# Patient Record
Sex: FEMALE | Race: WHITE | NOT HISPANIC OR LATINO | Employment: FULL TIME
[De-identification: names, ages, dates, MRNs, and addresses within clinical notes are randomized per-mention and may not be internally consistent; named-entity substitution may affect disease eponyms.]

---

## 2017-08-05 ENCOUNTER — HEALTH MAINTENANCE LETTER (OUTPATIENT)
Age: 22
End: 2017-08-05

## 2019-06-19 ENCOUNTER — HOSPITAL ENCOUNTER (EMERGENCY)
Facility: CLINIC | Age: 24
Discharge: HOME | End: 2019-06-19
Attending: STUDENT IN AN ORGANIZED HEALTH CARE EDUCATION/TRAINING PROGRAM | Admitting: STUDENT IN AN ORGANIZED HEALTH CARE EDUCATION/TRAINING PROGRAM
Payer: COMMERCIAL

## 2019-06-19 VITALS
RESPIRATION RATE: 16 BRPM | OXYGEN SATURATION: 99 % | TEMPERATURE: 98.8 F | HEART RATE: 76 BPM | WEIGHT: 150 LBS | DIASTOLIC BLOOD PRESSURE: 99 MMHG | SYSTOLIC BLOOD PRESSURE: 134 MMHG | BODY MASS INDEX: 26.57 KG/M2

## 2019-06-19 DIAGNOSIS — F41.9 ANXIETY: ICD-10-CM

## 2019-06-19 LAB
ANION GAP SERPL CALCULATED.3IONS-SCNC: 5 MMOL/L (ref 3–14)
BASOPHILS # BLD AUTO: 0 10E9/L (ref 0–0.2)
BASOPHILS NFR BLD AUTO: 0.6 %
BUN SERPL-MCNC: 6 MG/DL (ref 7–30)
CALCIUM SERPL-MCNC: 9.5 MG/DL (ref 8.5–10.1)
CHLORIDE SERPL-SCNC: 107 MMOL/L (ref 94–109)
CO2 SERPL-SCNC: 28 MMOL/L (ref 20–32)
CREAT SERPL-MCNC: 0.44 MG/DL (ref 0.52–1.04)
DIFFERENTIAL METHOD BLD: NORMAL
EOSINOPHIL # BLD AUTO: 0.2 10E9/L (ref 0–0.7)
EOSINOPHIL NFR BLD AUTO: 2.3 %
ERYTHROCYTE [DISTWIDTH] IN BLOOD BY AUTOMATED COUNT: 12.1 % (ref 10–15)
GFR SERPL CREATININE-BSD FRML MDRD: >90 ML/MIN/{1.73_M2}
GLUCOSE SERPL-MCNC: 93 MG/DL (ref 70–99)
HCG SERPL QL: NEGATIVE
HCT VFR BLD AUTO: 43.5 % (ref 35–47)
HGB BLD-MCNC: 14.5 G/DL (ref 11.7–15.7)
IMM GRANULOCYTES # BLD: 0 10E9/L (ref 0–0.4)
IMM GRANULOCYTES NFR BLD: 0.2 %
LYMPHOCYTES # BLD AUTO: 2.1 10E9/L (ref 0.8–5.3)
LYMPHOCYTES NFR BLD AUTO: 32.8 %
MCH RBC QN AUTO: 30.7 PG (ref 26.5–33)
MCHC RBC AUTO-ENTMCNC: 33.3 G/DL (ref 31.5–36.5)
MCV RBC AUTO: 92 FL (ref 78–100)
MONOCYTES # BLD AUTO: 0.6 10E9/L (ref 0–1.3)
MONOCYTES NFR BLD AUTO: 9.2 %
NEUTROPHILS # BLD AUTO: 3.6 10E9/L (ref 1.6–8.3)
NEUTROPHILS NFR BLD AUTO: 54.9 %
NRBC # BLD AUTO: 0 10*3/UL
NRBC BLD AUTO-RTO: 0 /100
PLATELET # BLD AUTO: 250 10E9/L (ref 150–450)
POTASSIUM SERPL-SCNC: 3.7 MMOL/L (ref 3.4–5.3)
RBC # BLD AUTO: 4.72 10E12/L (ref 3.8–5.2)
SODIUM SERPL-SCNC: 140 MMOL/L (ref 133–144)
TROPONIN I SERPL-MCNC: <0.015 UG/L (ref 0–0.04)
WBC # BLD AUTO: 6.5 10E9/L (ref 4–11)

## 2019-06-19 PROCEDURE — 25000132 ZZH RX MED GY IP 250 OP 250 PS 637: Performed by: STUDENT IN AN ORGANIZED HEALTH CARE EDUCATION/TRAINING PROGRAM

## 2019-06-19 PROCEDURE — 99284 EMERGENCY DEPT VISIT MOD MDM: CPT | Performed by: STUDENT IN AN ORGANIZED HEALTH CARE EDUCATION/TRAINING PROGRAM

## 2019-06-19 PROCEDURE — 93005 ELECTROCARDIOGRAM TRACING: CPT | Performed by: STUDENT IN AN ORGANIZED HEALTH CARE EDUCATION/TRAINING PROGRAM

## 2019-06-19 PROCEDURE — 84703 CHORIONIC GONADOTROPIN ASSAY: CPT | Performed by: STUDENT IN AN ORGANIZED HEALTH CARE EDUCATION/TRAINING PROGRAM

## 2019-06-19 PROCEDURE — 90791 PSYCH DIAGNOSTIC EVALUATION: CPT

## 2019-06-19 PROCEDURE — 99285 EMERGENCY DEPT VISIT HI MDM: CPT | Mod: 25 | Performed by: STUDENT IN AN ORGANIZED HEALTH CARE EDUCATION/TRAINING PROGRAM

## 2019-06-19 PROCEDURE — 93010 ELECTROCARDIOGRAM REPORT: CPT | Mod: Z6 | Performed by: STUDENT IN AN ORGANIZED HEALTH CARE EDUCATION/TRAINING PROGRAM

## 2019-06-19 PROCEDURE — 80048 BASIC METABOLIC PNL TOTAL CA: CPT | Performed by: STUDENT IN AN ORGANIZED HEALTH CARE EDUCATION/TRAINING PROGRAM

## 2019-06-19 PROCEDURE — 85025 COMPLETE CBC W/AUTO DIFF WBC: CPT | Performed by: STUDENT IN AN ORGANIZED HEALTH CARE EDUCATION/TRAINING PROGRAM

## 2019-06-19 PROCEDURE — 84484 ASSAY OF TROPONIN QUANT: CPT | Performed by: STUDENT IN AN ORGANIZED HEALTH CARE EDUCATION/TRAINING PROGRAM

## 2019-06-19 RX ORDER — HYDROXYZINE HYDROCHLORIDE 25 MG/1
25-50 TABLET, FILM COATED ORAL EVERY 8 HOURS PRN
Qty: 30 TABLET | Refills: 0 | Status: SHIPPED | OUTPATIENT
Start: 2019-06-19 | End: 2021-02-10

## 2019-06-19 RX ORDER — HYDROXYZINE HYDROCHLORIDE 25 MG/1
25 TABLET, FILM COATED ORAL ONCE
Status: COMPLETED | OUTPATIENT
Start: 2019-06-19 | End: 2019-06-19

## 2019-06-19 RX ADMIN — HYDROXYZINE HYDROCHLORIDE 25 MG: 25 TABLET ORAL at 06:49

## 2019-06-19 NOTE — ED PROVIDER NOTES
"  History     Chief Complaint   Patient presents with     Panic Attack     HPI  Anali Lomas is a 23 year old female with past medical history which includes anxiety and migraine headaches who presents for evaluation of feelings of anxiousness and \"panic attacks\".  Patient explains that last evening around 8 PM she began feeling discomfort around her left shoulder, admittedly grew fixated on the symptom and potential cause of a heart attack.  She remained anxious overnight and had a very difficult time sleeping secondary to the symptoms.  Although it is described as a dull achy left-sided shoulder pain, she cannot stop worrying that it could be a symptom of a heart attack.  She denies fever, chills, substernal chest pressure, radiating pain, shortness of breath, back pain, hemoptysis, abdominal pain, or gastrointestinal symptoms.  Patient admits that she discontinued prescription medication Effexor 2-3 weeks ago because she felt as though it was not working.  Been working with her primary provider, she states that she has tried 3 separate medications including Prozac, Lexapro, and Effexor for her symptoms of anxiety, panic attacks, and OCD but not offered significant relief.  She also has a short prescription of lorazepam but admits that she rarely uses.  She has considered enrolling in counseling and/or with a psychologist but has yet to schedule an appointment.  She denies recent injury or illness.  Patient occasionally uses alcohol and correlates that with increasing her anxiety.  She denies tobacco, illicit drug, stimulant or caffeine use.    Allergies:  Allergies   Allergen Reactions     Zithromax [Azithromycin Dihydrate] Rash       Problem List:    Patient Active Problem List    Diagnosis Date Noted     Mixed hyperlipidemia 09/15/2016     Priority: Medium     Migraine with aura, not intractable, without status migrainosus 09/13/2016     Priority: Medium        Past Medical History:    No past medical " history on file.    Past Surgical History:    No past surgical history on file.    Family History:    No family history on file.    Social History:  Marital Status:   [2]  Social History     Tobacco Use     Smoking status: Never Smoker   Substance Use Topics     Alcohol use: Yes     Comment: 1 drink per week     Drug use: No        Medications:      hydrOXYzine (ATARAX) 25 MG tablet   AMOXICILLIN PO   miconazole nitrate (MONISTAT 3) 4 % CREA   senna (SENOKOT) 8.6 MG tablet         Review of Systems  Constitutional:  Negative for fever or recent illness.  Cardiovascular:  Negative for chest pain.  Respiratory:  Negative for cough or shortness of breath.  Gastrointestinal:  Negative for abdominal pain, nausea or vomiting.  Musculoskeletal: Positive for dull achy left shoulder discomfort.  Negative for recent fall or injury.  Neurological:  Negative for headache.    All others reviewed and are negative.      Physical Exam   BP: (!) 139/108  Pulse: 86  Heart Rate: 94  Temp: 98.8  F (37.1  C)  Resp: 18  Weight: 68 kg (150 lb)  SpO2: 99 %      Physical Exam  Constitutional:  Well developed, well nourished.  Appears anxious but in no acute distress.  HENT:  Normocephalic and atraumatic.  Symmetric in appearance.  Eyes:  Conjunctivae are normal.  Neck:  Neck supple.  Cardiovascular:  No cyanosis.  RRR.  No audible murmurs noted.  No lower extremity edema or asymmetry.   Respiratory:  Effort normal without sign of respiratory distress.  CTAB without diminished regions.    Gastrointestinal:  Soft nondistended abdomen.  Nontender and without guarding.  No rigidity or rebound tenderness.  Negative Aleman's sign.  Negative McBurney's point.    Musculoskeletal:  Moves extremities spontaneously.  Neurological:  Patient is alert.  Skin:  Skin is warm and dry.  Psych:  Well-kept appearance.  Patient does not show signs of confusion or intoxication.  Able to carry a conversation with organized speech and thought process.   Seems to have insight into their perceived condition.  Daughter really anxious but is not agitated or threatening.  Denies suicidal ideation or intent to harm self/others.  Denies visual or auditory hallucinations.  Without evidence of delusions or psychosis.      ED Course        Procedures                 EKG Interpretation:      Interpreted by: Leighton Calero  Time reviewed: Upon arrival     Symptoms at time of EKG: Anxiety   Rhythm: Sinus  Rate: Normal  Axis: Normal    Conduction: None atypical   ST Segments/ T Waves: No pathologic ST-elevations or T-wave abnormalities.  Q Waves: None  Comparison to prior: None available    Clinical Impression: No sign of ischemia or arrhythmia        Critical Care time:  none               Results for orders placed or performed during the hospital encounter of 06/19/19 (from the past 24 hour(s))   Troponin I   Result Value Ref Range    Troponin I ES <0.015 0.000 - 0.045 ug/L   Basic metabolic panel   Result Value Ref Range    Sodium 140 133 - 144 mmol/L    Potassium 3.7 3.4 - 5.3 mmol/L    Chloride 107 94 - 109 mmol/L    Carbon Dioxide 28 20 - 32 mmol/L    Anion Gap 5 3 - 14 mmol/L    Glucose 93 70 - 99 mg/dL    Urea Nitrogen 6 (L) 7 - 30 mg/dL    Creatinine 0.44 (L) 0.52 - 1.04 mg/dL    GFR Estimate >90 >60 mL/min/[1.73_m2]    GFR Estimate If Black >90 >60 mL/min/[1.73_m2]    Calcium 9.5 8.5 - 10.1 mg/dL   CBC with platelets differential   Result Value Ref Range    WBC 6.5 4.0 - 11.0 10e9/L    RBC Count 4.72 3.8 - 5.2 10e12/L    Hemoglobin 14.5 11.7 - 15.7 g/dL    Hematocrit 43.5 35.0 - 47.0 %    MCV 92 78 - 100 fl    MCH 30.7 26.5 - 33.0 pg    MCHC 33.3 31.5 - 36.5 g/dL    RDW 12.1 10.0 - 15.0 %    Platelet Count 250 150 - 450 10e9/L    Diff Method Automated Method     % Neutrophils 54.9 %    % Lymphocytes 32.8 %    % Monocytes 9.2 %    % Eosinophils 2.3 %    % Basophils 0.6 %    % Immature Granulocytes 0.2 %    Nucleated RBCs 0 0 /100    Absolute Neutrophil 3.6 1.6 - 8.3  10e9/L    Absolute Lymphocytes 2.1 0.8 - 5.3 10e9/L    Absolute Monocytes 0.6 0.0 - 1.3 10e9/L    Absolute Eosinophils 0.2 0.0 - 0.7 10e9/L    Absolute Basophils 0.0 0.0 - 0.2 10e9/L    Abs Immature Granulocytes 0.0 0 - 0.4 10e9/L    Absolute Nucleated RBC 0.0    HCG qualitative pregnancy (blood)   Result Value Ref Range    HCG Qualitative Serum Negative NEG^Negative       Medications   hydrOXYzine (ATARAX) tablet 25 mg (25 mg Oral Given 6/19/19 0649)       Assessments & Plan (with Medical Decision Making)   Anali Lomas is a 23 year old female who presented to the department for evaluation of in which she believes to be a panic reaction.  She has a long history of similar symptoms but is not currently taking long-term medications for her anxiety.  Regarding her left shoulder discomfort, no tenderness and low suspicion for musculoskeletal etiology.  EKG morphology without ischemia troponin within reference range despite duration of symptoms.  She describes the anxiety triggered by her symptoms to be keeping her up most of the night and sounds somewhat debilitating.  She adamantly denies suicidal ideation or plan, does not seem to be delusional or suffering from psychoses.  She obtained a leave from symptoms after single tablet of hydroxyzine.  Independently interviewed by [facility] who recommends discharge and outpatient therapy.  Patient seems comfortable with this plan, will receive a call from outpatient team to schedule in the near future.  She was also given recommendations to follow-up with primary care provider regarding medication management and return instructions to the department for eloping symptoms or other concerns or          Disclaimer:  This note consists of symbols derived from keyboarding, dictation, and/or voice recognition software.  As a result, there may be errors in the script that have gone undetected.  Please consider this when interpreting information found in the chart.        I have  reviewed the nursing notes.    I have reviewed the findings, diagnosis, plan and need for follow up with the patient.          Medication List      Started    hydrOXYzine 25 MG tablet  Commonly known as:  ATARAX  25-50 mg, Oral, EVERY 8 HOURS PRN            Final diagnoses:   Anxiety       6/19/2019   [facility] EMERGENCY DEPARTMENT     Leighton Calero DO  06/19/19 1114

## 2019-06-19 NOTE — ED AVS SNAPSHOT
[facility] Emergency Department  [address] [address]  Phone:  [phone]  Fax:  [phone]                                    Anali Lomas   MRN: [id number]    Department:  [facility] Emergency Department   Date of Visit:  6/19/2019           After Visit Summary Signature Page    I have received my discharge instructions, and my questions have been answered. I have discussed any challenges I see with this plan with the nurse or doctor.    ..........................................................................................................................................  Patient/Patient Representative Signature      ..........................................................................................................................................  Patient Representative Print Name and Relationship to Patient    ..................................................               ................................................  Date                                   Time    ..........................................................................................................................................  Reviewed by Signature/Title    ...................................................              ..............................................  Date                                               Time          22EPIC Rev 08/18

## 2019-06-19 NOTE — ED TRIAGE NOTES
Patient states started having anxiety about 2000 last night became SOB and having her heart feel like it was racing with inability to sleep more than 2 hours total. Patient very tearful at time of triage. States she has not been on any medications for about 3 or 4 weeks related to anxiety because they just don't work for her. Patient  in room at bedside.

## 2019-09-13 ENCOUNTER — TRANSFERRED RECORDS (OUTPATIENT)
Dept: HEALTH INFORMATION MANAGEMENT | Facility: CLINIC | Age: 24
End: 2019-09-13

## 2019-11-05 ENCOUNTER — HEALTH MAINTENANCE LETTER (OUTPATIENT)
Age: 24
End: 2019-11-05

## 2020-09-22 LAB
ABO + RH BLD: NORMAL
ABO + RH BLD: NORMAL
BLD GP AB SCN SERPL QL: NEGATIVE
HEMOGLOBIN: 14 G/DL (ref 11.7–15.7)
RUBELLA ABY IGG: NORMAL

## 2020-10-01 ENCOUNTER — TRANSFERRED RECORDS (OUTPATIENT)
Dept: HEALTH INFORMATION MANAGEMENT | Facility: CLINIC | Age: 25
End: 2020-10-01

## 2020-10-01 LAB — PAP SMEAR - HIM PATIENT REPORTED: NEGATIVE

## 2020-11-22 ENCOUNTER — HEALTH MAINTENANCE LETTER (OUTPATIENT)
Age: 25
End: 2020-11-22

## 2021-02-10 ENCOUNTER — OFFICE VISIT (OUTPATIENT)
Dept: FAMILY MEDICINE | Facility: CLINIC | Age: 26
End: 2021-02-10
Payer: COMMERCIAL

## 2021-02-10 VITALS
DIASTOLIC BLOOD PRESSURE: 78 MMHG | HEART RATE: 94 BPM | BODY MASS INDEX: 33.84 KG/M2 | TEMPERATURE: 98.7 F | OXYGEN SATURATION: 96 % | WEIGHT: 191 LBS | SYSTOLIC BLOOD PRESSURE: 122 MMHG | HEIGHT: 63 IN

## 2021-02-10 DIAGNOSIS — F41.1 GENERALIZED ANXIETY DISORDER: ICD-10-CM

## 2021-02-10 DIAGNOSIS — F42.9 OBSESSIVE-COMPULSIVE DISORDER, UNSPECIFIED TYPE: ICD-10-CM

## 2021-02-10 DIAGNOSIS — G43.109 MIGRAINE WITH AURA, NOT INTRACTABLE, WITHOUT STATUS MIGRAINOSUS: ICD-10-CM

## 2021-02-10 PROBLEM — F41.9 ANXIETY: Status: ACTIVE | Noted: 2021-02-10

## 2021-02-10 PROCEDURE — 99202 OFFICE O/P NEW SF 15 MIN: CPT | Performed by: FAMILY MEDICINE

## 2021-02-10 ASSESSMENT — PAIN SCALES - GENERAL: PAINLEVEL: NO PAIN (0)

## 2021-02-10 ASSESSMENT — MIFFLIN-ST. JEOR: SCORE: 1575.5

## 2021-02-10 NOTE — PROGRESS NOTES
"    Assessment & Plan   Estela is a ruby 25-year-old female who is currently 31 weeks pregnant.  She is here to establish care.  We have reviewed her past medical history, medications, social and family histories today.  Her chronic health issues are stable.  Generalized anxiety disorder  Obsessive-compulsive disorder, unspecified type  Migraine with aura, not intractable, without status migrainosus        25 minutes spent on the date of the encounter doing chart review, review of outside records, patient visit and documentation        BMI:   Estimated body mass index is 33.83 kg/m  as calculated from the following:    Height as of this encounter: 1.6 m (5' 3\").    Weight as of this encounter: 86.6 kg (191 lb).       Return for routine cares..    Sharita Bahena MD  [facility]    Asaf Palmer is a 25 year old who presents for the following health issues  accompanied by her self:    HPI       New Patient/Transfer of Care from [facility]  Due date: April 13th, 2021- having baby boy    Moved to [place] from [place] this last year.   [facility]. [facility].     Follows with the [facility] psychiatry for HAILEY and OCD.  Clomipramine for anxiety/ocd but off since pg.   Anxious and ocd tendandcies are present but has been managing okay.  She notes working from home has helped.  However, the pandemic has worsened some of her OCD as she is very worried about germs    31 weeks pg.   Planning to restart clomipramine following delivery    Migraines- none since pregnant    Pap smears all nl.   Last with ob-gyn at beginning of pg.     tdap completed recently at ob-gyn        Objective    /78 (BP Location: Right arm, Patient Position: Sitting, Cuff Size: Adult Large)   Pulse 94   Temp 98.7  F (37.1  C) (Tympanic)   Ht 1.6 m (5' 3\")   Wt 86.6 kg (191 lb)   SpO2 96%   BMI 33.83 kg/m    Body mass index is 33.83 kg/m .  Physical Exam   GENERAL: healthy, alert and no " distress

## 2021-02-10 NOTE — Clinical Note
Please abstract the following data from this visit with this patient into the appropriate field in Epic:    Tests that can be patient reported without a hard copy:    Pap smear done on this date: fall 2020 (approximately), by this group: ob-gyn [facility], results were nl.     Other Tests found in the patient's chart through Chart Review/Care Everywhere:    {Abstract Quality List (Optional):315978}    Note to Abstraction: If this section is blank, no results were found via Chart Review/Care Everywhere.

## 2021-03-16 LAB — GROUP B STREP PCR: POSITIVE

## 2021-04-13 DIAGNOSIS — Z34.90 ENCOUNTER FOR INDUCTION OF LABOR: Primary | ICD-10-CM

## 2021-04-13 RX ORDER — LIDOCAINE 40 MG/G
CREAM TOPICAL
Status: CANCELLED | OUTPATIENT
Start: 2021-04-13

## 2021-04-13 RX ORDER — OXYTOCIN/0.9 % SODIUM CHLORIDE 30/500 ML
1-24 PLASTIC BAG, INJECTION (ML) INTRAVENOUS CONTINUOUS
Status: CANCELLED | OUTPATIENT
Start: 2021-04-13

## 2021-04-14 ENCOUNTER — TRANSFERRED RECORDS (OUTPATIENT)
Dept: HEALTH INFORMATION MANAGEMENT | Facility: CLINIC | Age: 26
End: 2021-04-14

## 2021-04-14 ENCOUNTER — ANESTHESIA EVENT (OUTPATIENT)
Dept: OBGYN | Facility: CLINIC | Age: 26
End: 2021-04-14
Payer: COMMERCIAL

## 2021-04-14 ENCOUNTER — HOSPITAL ENCOUNTER (INPATIENT)
Facility: CLINIC | Age: 26
LOS: 2 days | Discharge: HOME | End: 2021-04-16
Attending: STUDENT IN AN ORGANIZED HEALTH CARE EDUCATION/TRAINING PROGRAM | Admitting: STUDENT IN AN ORGANIZED HEALTH CARE EDUCATION/TRAINING PROGRAM
Payer: COMMERCIAL

## 2021-04-14 ENCOUNTER — ANESTHESIA (OUTPATIENT)
Dept: OBGYN | Facility: CLINIC | Age: 26
End: 2021-04-14
Payer: COMMERCIAL

## 2021-04-14 DIAGNOSIS — Z34.90 ENCOUNTER FOR INDUCTION OF LABOR: Primary | ICD-10-CM

## 2021-04-14 LAB
ABO + RH BLD: NORMAL
ABO + RH BLD: NORMAL
AMPHETAMINES UR QL SCN: NEGATIVE
CANNABINOIDS UR QL: NEGATIVE
COCAINE UR QL: NEGATIVE
LABORATORY COMMENT REPORT: NORMAL
OPIATES UR QL SCN: NEGATIVE
PCP UR QL SCN: NEGATIVE
PLATELET # BLD AUTO: 205 10E9/L (ref 150–450)
RUPTURE OF FETAL MEMBRANES BY ROM PLUS: POSITIVE
SARS-COV-2 RNA RESP QL NAA+PROBE: NEGATIVE
SPECIMEN EXP DATE BLD: NORMAL
SPECIMEN SOURCE: NORMAL
T PALLIDUM AB SER QL: NONREACTIVE

## 2021-04-14 PROCEDURE — 36415 COLL VENOUS BLD VENIPUNCTURE: CPT | Performed by: PHYSICIAN ASSISTANT

## 2021-04-14 PROCEDURE — 86900 BLOOD TYPING SEROLOGIC ABO: CPT | Performed by: PHYSICIAN ASSISTANT

## 2021-04-14 PROCEDURE — 86780 TREPONEMA PALLIDUM: CPT | Performed by: PHYSICIAN ASSISTANT

## 2021-04-14 PROCEDURE — 87635 SARS-COV-2 COVID-19 AMP PRB: CPT | Performed by: STUDENT IN AN ORGANIZED HEALTH CARE EDUCATION/TRAINING PROGRAM

## 2021-04-14 PROCEDURE — 258N000003 HC RX IP 258 OP 636: Performed by: ANESTHESIOLOGY

## 2021-04-14 PROCEDURE — 250N000013 HC RX MED GY IP 250 OP 250 PS 637: Performed by: PHYSICIAN ASSISTANT

## 2021-04-14 PROCEDURE — 250N000011 HC RX IP 250 OP 636: Performed by: PHYSICIAN ASSISTANT

## 2021-04-14 PROCEDURE — 258N000003 HC RX IP 258 OP 636: Performed by: PHYSICIAN ASSISTANT

## 2021-04-14 PROCEDURE — 120N000012 HC R&B POSTPARTUM

## 2021-04-14 PROCEDURE — 85049 AUTOMATED PLATELET COUNT: CPT | Performed by: PHYSICIAN ASSISTANT

## 2021-04-14 PROCEDURE — 250N000013 HC RX MED GY IP 250 OP 250 PS 637: Performed by: STUDENT IN AN ORGANIZED HEALTH CARE EDUCATION/TRAINING PROGRAM

## 2021-04-14 PROCEDURE — G0463 HOSPITAL OUTPT CLINIC VISIT: HCPCS | Mod: 25

## 2021-04-14 PROCEDURE — 0KQM0ZZ REPAIR PERINEUM MUSCLE, OPEN APPROACH: ICD-10-PCS | Performed by: STUDENT IN AN ORGANIZED HEALTH CARE EDUCATION/TRAINING PROGRAM

## 2021-04-14 PROCEDURE — 250N000009 HC RX 250: Performed by: PHYSICIAN ASSISTANT

## 2021-04-14 PROCEDURE — 370N000003 HC ANESTHESIA WARD SERVICE

## 2021-04-14 PROCEDURE — C9803 HOPD COVID-19 SPEC COLLECT: HCPCS

## 2021-04-14 PROCEDURE — 86901 BLOOD TYPING SEROLOGIC RH(D): CPT | Performed by: PHYSICIAN ASSISTANT

## 2021-04-14 PROCEDURE — 250N000011 HC RX IP 250 OP 636: Performed by: ANESTHESIOLOGY

## 2021-04-14 PROCEDURE — 250N000009 HC RX 250: Performed by: ANESTHESIOLOGY

## 2021-04-14 PROCEDURE — 722N000001 HC LABOR CARE VAGINAL DELIVERY SINGLE

## 2021-04-14 PROCEDURE — 59025 FETAL NON-STRESS TEST: CPT

## 2021-04-14 PROCEDURE — 84112 EVAL AMNIOTIC FLUID PROTEIN: CPT | Performed by: OBSTETRICS & GYNECOLOGY

## 2021-04-14 PROCEDURE — 80307 DRUG TEST PRSMV CHEM ANLYZR: CPT | Performed by: STUDENT IN AN ORGANIZED HEALTH CARE EDUCATION/TRAINING PROGRAM

## 2021-04-14 PROCEDURE — 250N000009 HC RX 250: Performed by: STUDENT IN AN ORGANIZED HEALTH CARE EDUCATION/TRAINING PROGRAM

## 2021-04-14 RX ORDER — METHYLERGONOVINE MALEATE 0.2 MG/ML
200 INJECTION INTRAVENOUS
Status: DISCONTINUED | OUTPATIENT
Start: 2021-04-14 | End: 2021-04-16 | Stop reason: HOSPADM

## 2021-04-14 RX ORDER — OXYTOCIN/0.9 % SODIUM CHLORIDE 30/500 ML
340 PLASTIC BAG, INJECTION (ML) INTRAVENOUS CONTINUOUS PRN
Status: DISCONTINUED | OUTPATIENT
Start: 2021-04-14 | End: 2021-04-16 | Stop reason: HOSPADM

## 2021-04-14 RX ORDER — SODIUM CHLORIDE, SODIUM LACTATE, POTASSIUM CHLORIDE, CALCIUM CHLORIDE 600; 310; 30; 20 MG/100ML; MG/100ML; MG/100ML; MG/100ML
INJECTION, SOLUTION INTRAVENOUS CONTINUOUS
Status: DISCONTINUED | OUTPATIENT
Start: 2021-04-14 | End: 2021-04-16 | Stop reason: HOSPADM

## 2021-04-14 RX ORDER — OXYTOCIN 10 [USP'U]/ML
10 INJECTION, SOLUTION INTRAMUSCULAR; INTRAVENOUS
Status: DISCONTINUED | OUTPATIENT
Start: 2021-04-14 | End: 2021-04-15

## 2021-04-14 RX ORDER — ROPIVACAINE HYDROCHLORIDE 2 MG/ML
10 INJECTION, SOLUTION EPIDURAL; INFILTRATION; PERINEURAL ONCE
Status: COMPLETED | OUTPATIENT
Start: 2021-04-14 | End: 2021-04-14

## 2021-04-14 RX ORDER — LIDOCAINE HYDROCHLORIDE AND EPINEPHRINE 15; 5 MG/ML; UG/ML
INJECTION, SOLUTION EPIDURAL PRN
Status: DISCONTINUED | OUTPATIENT
Start: 2021-04-14 | End: 2021-04-15 | Stop reason: HOSPADM

## 2021-04-14 RX ORDER — OXYTOCIN/0.9 % SODIUM CHLORIDE 30/500 ML
100-340 PLASTIC BAG, INJECTION (ML) INTRAVENOUS CONTINUOUS PRN
Status: COMPLETED | OUTPATIENT
Start: 2021-04-14 | End: 2021-04-14

## 2021-04-14 RX ORDER — OXYTOCIN 10 [USP'U]/ML
10 INJECTION, SOLUTION INTRAMUSCULAR; INTRAVENOUS
Status: DISCONTINUED | OUTPATIENT
Start: 2021-04-14 | End: 2021-04-16 | Stop reason: HOSPADM

## 2021-04-14 RX ORDER — NALOXONE HYDROCHLORIDE 0.4 MG/ML
0.2 INJECTION, SOLUTION INTRAMUSCULAR; INTRAVENOUS; SUBCUTANEOUS
Status: DISCONTINUED | OUTPATIENT
Start: 2021-04-14 | End: 2021-04-16 | Stop reason: HOSPADM

## 2021-04-14 RX ORDER — PENICILLIN G POTASSIUM 5000000 [IU]/1
5 INJECTION, POWDER, FOR SOLUTION INTRAMUSCULAR; INTRAVENOUS ONCE
Status: COMPLETED | OUTPATIENT
Start: 2021-04-14 | End: 2021-04-14

## 2021-04-14 RX ORDER — TRANEXAMIC ACID 10 MG/ML
1 INJECTION, SOLUTION INTRAVENOUS EVERY 30 MIN PRN
Status: DISCONTINUED | OUTPATIENT
Start: 2021-04-14 | End: 2021-04-16 | Stop reason: HOSPADM

## 2021-04-14 RX ORDER — MODIFIED LANOLIN
OINTMENT (GRAM) TOPICAL
Status: DISCONTINUED | OUTPATIENT
Start: 2021-04-14 | End: 2021-04-16 | Stop reason: HOSPADM

## 2021-04-14 RX ORDER — ONDANSETRON 2 MG/ML
4 INJECTION INTRAMUSCULAR; INTRAVENOUS EVERY 6 HOURS PRN
Status: DISCONTINUED | OUTPATIENT
Start: 2021-04-14 | End: 2021-04-16 | Stop reason: HOSPADM

## 2021-04-14 RX ORDER — HYDROCORTISONE 2.5 %
CREAM (GRAM) TOPICAL 3 TIMES DAILY PRN
Status: DISCONTINUED | OUTPATIENT
Start: 2021-04-14 | End: 2021-04-16 | Stop reason: HOSPADM

## 2021-04-14 RX ORDER — NALOXONE HYDROCHLORIDE 0.4 MG/ML
0.4 INJECTION, SOLUTION INTRAMUSCULAR; INTRAVENOUS; SUBCUTANEOUS
Status: DISCONTINUED | OUTPATIENT
Start: 2021-04-14 | End: 2021-04-16 | Stop reason: HOSPADM

## 2021-04-14 RX ORDER — BISACODYL 10 MG
10 SUPPOSITORY, RECTAL RECTAL DAILY PRN
Status: DISCONTINUED | OUTPATIENT
Start: 2021-04-16 | End: 2021-04-16 | Stop reason: HOSPADM

## 2021-04-14 RX ORDER — IBUPROFEN 400 MG/1
800 TABLET, FILM COATED ORAL
Status: COMPLETED | OUTPATIENT
Start: 2021-04-14 | End: 2021-04-15

## 2021-04-14 RX ORDER — OXYCODONE AND ACETAMINOPHEN 5; 325 MG/1; MG/1
1 TABLET ORAL
Status: COMPLETED | OUTPATIENT
Start: 2021-04-14 | End: 2021-04-15

## 2021-04-14 RX ORDER — IBUPROFEN 400 MG/1
800 TABLET, FILM COATED ORAL EVERY 6 HOURS PRN
Status: DISCONTINUED | OUTPATIENT
Start: 2021-04-14 | End: 2021-04-16 | Stop reason: HOSPADM

## 2021-04-14 RX ORDER — OXYTOCIN/0.9 % SODIUM CHLORIDE 30/500 ML
1-24 PLASTIC BAG, INJECTION (ML) INTRAVENOUS CONTINUOUS
Status: DISCONTINUED | OUTPATIENT
Start: 2021-04-14 | End: 2021-04-15

## 2021-04-14 RX ORDER — FENTANYL CITRATE 50 UG/ML
100 INJECTION, SOLUTION INTRAMUSCULAR; INTRAVENOUS ONCE
Status: COMPLETED | OUTPATIENT
Start: 2021-04-14 | End: 2021-04-14

## 2021-04-14 RX ORDER — CARBOPROST TROMETHAMINE 250 UG/ML
250 INJECTION, SOLUTION INTRAMUSCULAR
Status: DISCONTINUED | OUTPATIENT
Start: 2021-04-14 | End: 2021-04-16 | Stop reason: HOSPADM

## 2021-04-14 RX ORDER — ONDANSETRON 4 MG/1
4 TABLET, ORALLY DISINTEGRATING ORAL EVERY 6 HOURS PRN
Status: DISCONTINUED | OUTPATIENT
Start: 2021-04-14 | End: 2021-04-16 | Stop reason: HOSPADM

## 2021-04-14 RX ORDER — NALBUPHINE HYDROCHLORIDE 10 MG/ML
2.5-5 INJECTION, SOLUTION INTRAMUSCULAR; INTRAVENOUS; SUBCUTANEOUS EVERY 6 HOURS PRN
Status: DISCONTINUED | OUTPATIENT
Start: 2021-04-14 | End: 2021-04-16 | Stop reason: HOSPADM

## 2021-04-14 RX ORDER — AMOXICILLIN 250 MG
1 CAPSULE ORAL 2 TIMES DAILY
Status: DISCONTINUED | OUTPATIENT
Start: 2021-04-14 | End: 2021-04-16 | Stop reason: HOSPADM

## 2021-04-14 RX ORDER — OXYTOCIN/0.9 % SODIUM CHLORIDE 30/500 ML
100 PLASTIC BAG, INJECTION (ML) INTRAVENOUS CONTINUOUS
Status: DISCONTINUED | OUTPATIENT
Start: 2021-04-14 | End: 2021-04-16 | Stop reason: HOSPADM

## 2021-04-14 RX ORDER — TRANEXAMIC ACID 10 MG/ML
1 INJECTION, SOLUTION INTRAVENOUS EVERY 30 MIN PRN
Status: DISCONTINUED | OUTPATIENT
Start: 2021-04-14 | End: 2021-04-15

## 2021-04-14 RX ORDER — EPHEDRINE SULFATE 50 MG/ML
5 INJECTION, SOLUTION INTRAMUSCULAR; INTRAVENOUS; SUBCUTANEOUS
Status: DISCONTINUED | OUTPATIENT
Start: 2021-04-14 | End: 2021-04-15

## 2021-04-14 RX ORDER — ACETAMINOPHEN 325 MG/1
650 TABLET ORAL EVERY 4 HOURS PRN
Status: DISCONTINUED | OUTPATIENT
Start: 2021-04-14 | End: 2021-04-16 | Stop reason: HOSPADM

## 2021-04-14 RX ORDER — AMOXICILLIN 250 MG
2 CAPSULE ORAL 2 TIMES DAILY
Status: DISCONTINUED | OUTPATIENT
Start: 2021-04-14 | End: 2021-04-16 | Stop reason: HOSPADM

## 2021-04-14 RX ORDER — ACETAMINOPHEN 325 MG/1
650 TABLET ORAL EVERY 4 HOURS PRN
Status: DISCONTINUED | OUTPATIENT
Start: 2021-04-14 | End: 2021-04-15

## 2021-04-14 RX ADMIN — SODIUM CHLORIDE, POTASSIUM CHLORIDE, SODIUM LACTATE AND CALCIUM CHLORIDE 1000 ML: 600; 310; 30; 20 INJECTION, SOLUTION INTRAVENOUS at 07:30

## 2021-04-14 RX ADMIN — Medication 12 ML/HR: at 14:29

## 2021-04-14 RX ADMIN — SODIUM CHLORIDE 2.5 MILLION UNITS: 9 INJECTION, SOLUTION INTRAVENOUS at 10:20

## 2021-04-14 RX ADMIN — FENTANYL CITRATE 100 MCG: 50 INJECTION, SOLUTION INTRAMUSCULAR; INTRAVENOUS at 08:23

## 2021-04-14 RX ADMIN — IBUPROFEN 800 MG: 400 TABLET ORAL at 18:41

## 2021-04-14 RX ADMIN — Medication 2 MILLI-UNITS/MIN: at 11:24

## 2021-04-14 RX ADMIN — LIDOCAINE HYDROCHLORIDE AND EPINEPHRINE 3 ML: 15; 5 INJECTION, SOLUTION EPIDURAL at 08:18

## 2021-04-14 RX ADMIN — SENNOSIDES AND DOCUSATE SODIUM 2 TABLET: 8.6; 5 TABLET ORAL at 18:42

## 2021-04-14 RX ADMIN — SODIUM CHLORIDE 2.5 MILLION UNITS: 9 INJECTION, SOLUTION INTRAVENOUS at 14:25

## 2021-04-14 RX ADMIN — SODIUM CHLORIDE, POTASSIUM CHLORIDE, SODIUM LACTATE AND CALCIUM CHLORIDE: 600; 310; 30; 20 INJECTION, SOLUTION INTRAVENOUS at 08:25

## 2021-04-14 RX ADMIN — Medication 12 ML/HR: at 08:22

## 2021-04-14 RX ADMIN — Medication 340 ML/HR: at 17:26

## 2021-04-14 RX ADMIN — ACETAMINOPHEN 650 MG: 325 TABLET, FILM COATED ORAL at 18:41

## 2021-04-14 RX ADMIN — ROPIVACAINE HYDROCHLORIDE 10 ML: 2 INJECTION, SOLUTION EPIDURAL; INFILTRATION at 08:23

## 2021-04-14 RX ADMIN — PENICILLIN G POTASSIUM 5 MILLION UNITS: 5000000 POWDER, FOR SOLUTION INTRAMUSCULAR; INTRAPLEURAL; INTRATHECAL; INTRAVENOUS at 06:19

## 2021-04-14 ASSESSMENT — ACTIVITIES OF DAILY LIVING (ADL)
DIFFICULTY_EATING/SWALLOWING: NO
DOING_ERRANDS_INDEPENDENTLY_DIFFICULTY: NO
TOILETING_ISSUES: NO
FALL_HISTORY_WITHIN_LAST_SIX_MONTHS: NO
DIFFICULTY_COMMUNICATING: NO
WALKING_OR_CLIMBING_STAIRS_DIFFICULTY: NO
CONCENTRATING,_REMEMBERING_OR_MAKING_DECISIONS_DIFFICULTY: NO
DRESSING/BATHING_DIFFICULTY: NO

## 2021-04-14 ASSESSMENT — MIFFLIN-ST. JEOR: SCORE: 1619.72

## 2021-04-14 NOTE — PLAN OF CARE
Live, male  via  at 1722. 60 second delayed cord clamping then to mother's chest with Lesly TARANGO RN. Apgars 8/9. Vigorous cry. LGA. Dried, stimulated, bands placed, vitals performed at bedside.

## 2021-04-14 NOTE — ANESTHESIA PROCEDURE NOTES
Epidural catheter Procedure Note  Pre-Procedure   Staff -        Anesthesiologist:  Calos Machado MD       Performed By: anesthesiologist       Location: OB       Pre-Anesthestic Checklist: patient identified, IV checked, site marked, risks and benefits discussed, informed consent, monitors and equipment checked and pre-op evaluation  Timeout:       Correct Patient: Yes        Correct Procedure: Yes        Correct Site: Yes        Correct Position: Yes   Procedure Documentation  Procedure: epidural catheter       Patient Position: sitting       Patient Prep/Sterile Barriers: sterile gloves, mask, patient draped, hand hygeine       Skin prep: Betadine       Local skin infiltrated with 3 mL of 1% lidocaine.        Insertion Site: L3-4. (midline approach).       Technique: LORT saline        JUANY at 6 cm.       Needle Type: Finexkapy needle       Needle Gauge: 17.        Needle Length (Inches): 3.5        Catheter: 19 G.         Catheter threaded easily.         5 cm epidural space.         Threaded 11 cm at skin.        # of attempts: 1 and  # of redirects:     Assessment/Narrative         Paresthesias: No.      Test dose of 3 mL lidocaine 1.5% w/ 1:200,000 epinephrine at.         Test dose negative, 3 minutes after injection, for signs of intravascular, subdural, or intrathecal injection.       Insertion/Infusion Method: LORT saline       Aspiration negative for Heme or CSF via Epidural Catheter.    Comments:  Eddy JUANY at 6cm.  Catheter threaded easily.  Negative aspiration, negative test dose.  No abnormal pain or paresthesia throughout.  No complications.  Patient tolerated well.

## 2021-04-14 NOTE — ANESTHESIA PREPROCEDURE EVALUATION
Anesthesia Pre-Procedure Evaluation    Patient: Anali Lomas   MRN: [id number] : 1995        Preoperative Diagnosis: * No surgery found *   Procedure :      Past Medical History:   Diagnosis Date     Depressive disorder     stopped anxiety meds when first pregnant      Past Surgical History:   Procedure Laterality Date     wisdom teeth        Allergies   Allergen Reactions     Zithromax [Azithromycin Dihydrate] Rash      Social History     Tobacco Use     Smoking status: Never Smoker     Smokeless tobacco: Never Used   Substance Use Topics     Alcohol use: Not Currently      Wt Readings from Last 1 Encounters:   21 90.3 kg (199 lb)        Anesthesia Evaluation            ROS/MED HX  ENT/Pulmonary:    (-) asthma   Neurologic:       Cardiovascular:    (-) PIH   METS/Exercise Tolerance:     Hematologic:  - neg hematologic  ROS     Musculoskeletal:       GI/Hepatic:       Renal/Genitourinary:       Endo:       Psychiatric/Substance Use:       Infectious Disease:       Malignancy:       Other:            Physical Exam    Airway        Mallampati: II   TM distance: > 3 FB   Neck ROM: full   Mouth opening: > 3 cm    Respiratory Devices and Support         Dental           Cardiovascular             Pulmonary                   OUTSIDE LABS:  CBC:   Lab Results   Component Value Date    WBC 6.5 2019    WBC 7.5 2013    HGB 14 2020    HGB 14.5 2019    HCT 43.5 2019    HCT 42.5 2013     2021     2019     BMP:   Lab Results   Component Value Date     2019     12/10/2012    POTASSIUM 3.7 2019    POTASSIUM 3.7 12/10/2012    CHLORIDE 107 2019    CHLORIDE 104 12/10/2012    CO2 28 2019    CO2 24 12/10/2012    BUN 6 (L) 2019    BUN 9 12/10/2012    CR 0.44 (L) 2019    CR 0.63 12/10/2012    GLC 93 2019    GLC 95 12/10/2012     COAGS: No results found for: PTT, INR, FIBR  POC:   Lab Results   Component  Value Date    HCG Negative 09/13/2015    HCGS Negative 06/19/2019     HEPATIC:   Lab Results   Component Value Date    ALBUMIN 4.8 12/10/2012    PROTTOTAL 8.2 12/10/2012    ALT 36 12/10/2012    AST 24 12/10/2012    ALKPHOS 72 12/10/2012    BILITOTAL 0.5 12/10/2012     OTHER:   Lab Results   Component Value Date    BRIANDA 9.5 06/19/2019    LIPASE 93 12/10/2012       Anesthesia Plan    ASA Status:  2      Anesthesia Type: Epidural.              Consents    Anesthesia Plan(s) and associated risks, benefits, and realistic alternatives discussed. Questions answered and patient/representative(s) expressed understanding.     - Discussed with:  Patient         Postoperative Care            Comments:                Calos Machado MD

## 2021-04-14 NOTE — PLAN OF CARE
Patient and spouse updated with plan of care. All belongings gathered and taken by spouse. Patient and spouse escorted to room 215 for admission to labor and delivery. SBAR report to Ebony CAMPBELL RN to assume all cares for this patient.

## 2021-04-14 NOTE — PROVIDER NOTIFICATION
04/14/21 0500   Provider Notification   Provider Name/Title Dr. Elena   Method of Notification In Department;At Bedside   Request Evaluate in Person   Notification Reason Patient Arrived;SVE;Lab/Diagnostic Study   Will initiate signed and held orders along with COVID order.

## 2021-04-14 NOTE — PLAN OF CARE
COVID test explained to patient. Verbal consent for test given by patient. Swab inserted into right nare without difficulty. Swab sent to lab.

## 2021-04-14 NOTE — L&D DELIVERY NOTE
VAGINAL DELIVERY NOTE    SURGEON: Amanda Shaver MD  PREOPERATIVE DIAGNOSIS: Single intrauterine pregnancy at 40w1d  POSTOPERATIVE DIAGNOSIS: Same, delivered  OPERATION PERFORMED: Normal spontaneous vaginal delivery  ANESTHESIA: Epidural  EBL: 300 mL, QBL to be done by nursing still pending  FLUIDS: Lactated Ringers at 125 mL/hour  URINE OUTPUT: Not measured  DRAINS: None  SPECIMENS: Cord blood   COMPLICATIONS: None  FINDINGS: Viable male infant weighing PENDING grams with APGARs of 8 and 9 at 1 and 5 minutes, respectively.  CONDITION: Both mother and infant stable in the immediate postpartum period. The patient remained in labor and delivery for recovery and the infant was kept in the room with her.  INDICATIONS: The patient is a 25 year old  who presented at 40w1d estimated gestational age after rupture of membranes at home. She was dilated 1cm on arrival and labor was augmented with pitocin. FHT was category 1 throughout labor.     OPERATION: The patient progressed to complete/complete/+ 2 station and pushed for approximately 1 hour to deliver a viable male infant weighing PENDING grams with APGARs of 8 and 9 at one and five minutes, respectively via  over an intact perineum under epidural anesthesia. The baby delivered in occiput anterior position. There was no nuchal cord. The remainder of the body delivered without incident. Nose and mouth were bulb suctioned and the cord was clamped times two and cut. The baby was placed on the maternal abdomen. Placenta, membranes, and a three vessel cord delivered spontaneously and intact. Inspection of the perineum revealed a second degree perineal laceration  And left labial laceration that were repaired with 2-0 and 3-0 Vicryl in routine fashion with excellent hemostasis. The rectum, sulci, and cervix were inspected and noted to be intact. The fundus was firm with IV oxytocin and fundal massage. There were no sponges left in the vagina.

## 2021-04-14 NOTE — PROVIDER NOTIFICATION
04/14/21 0555   Provider Notification   Provider Name/Title Dr. Elena   Method of Notification In Department   Request Evaluate in Person   Notification Reason Membrane Status;Labor Status;Uterine Activity;Pain;Lab/Diagnostic Study;SVE;Status Update   No new orders

## 2021-04-14 NOTE — PLAN OF CARE
0725: This RN assumes care of patient.     0805: Dr. Machado in room discussing epidural.    0825: Patient lying on left side, epidural running and patent.

## 2021-04-14 NOTE — PLAN OF CARE
" presents at 40 1/7 weeks gestation stating \"I had a big gush of fluid at 0200.\" External monitors applied after verbal consent. Admission database obtained and prenatal record reviewed. Vital signs obtained, blood pressure slightly elevated. Will do serial blood pressures and monitor. ROM+ obtained and sent to lab. SVE /. Patient and spouse oriented to room, call light and plan of care while in the MAC.  "

## 2021-04-14 NOTE — H&P
[facility] Labor and Delivery History and Physical    Anali Lomas MRN# [id number]   Age: 25 year old YOB: 1995     Date of Admission:  2021    Primary care provider: Sharita Bahena         Chief Complaint:   Anali Lomas is a 25 year old  who is 40w1d pregnant and being admitted for SROM in early labor. Reports she has been feeling contractions since cervical exam in the office yesterday. Felt a contraction and large gush of fluid at 0200.           Pregnancy history:     OBSTETRIC HISTORY:    OB History    Para Term  AB Living   2 0 0 0 1 0   SAB TAB Ectopic Multiple Live Births   0 0 0 0 0      # Outcome Date GA Lbr Bernard/2nd Weight Sex Delivery Anes PTL Lv   2 Current            1 AB                EDC: Estimated Date of Delivery: 2021    Prenatal Labs:   Lab Results   Component Value Date    ABO PENDING 2021    RH Pos 2020    AS Negative 2020    RUBELLAABIGG Immune 2020    HGB 14 2020       GBS Status:   Lab Results   Component Value Date    GBS Positive 2021       Active Problem List  Patient Active Problem List   Diagnosis     Migraine with aura, not intractable, without status migrainosus     Mixed hyperlipidemia     Anxiety     OCD (obsessive compulsive disorder)     Encounter for induction of labor       Medication Prior to Admission  Medications Prior to Admission   Medication Sig Dispense Refill Last Dose     Prenatal Vit-Fe Fumarate-FA (PRENATAL VITAMIN PO) Take 1 tablet by mouth daily   Past Week at 0800   .        Maternal Past Medical History:     Past Medical History:   Diagnosis Date     Depressive disorder     stopped anxiety meds when first pregnant                       Family History:   This patient has no significant family history            Social History:     Social History     Tobacco Use     Smoking status: Never Smoker     Smokeless tobacco: Never Used   Substance Use  Topics     Alcohol use: Not Currently            Review of Systems:   C: NEGATIVE for fever, chills, change in weight  E/M: NEGATIVE for ear, mouth and throat problems  R: NEGATIVE for significant cough or SOB  CV: NEGATIVE for chest pain, palpitations or peripheral edema          Physical Exam:   Vitals were reviewed    Constitutional: Awake, alert, cooperative, no apparent distress, and appears stated age.  HEENT: Normocephalic, without obvious abnormality, atramatic  Neck: Supple, symmetrical  Back: Symmetric, no costal vertebral tenderness.  Lungs: No increased work of breathing, good air exchange, clear to auscultation bilaterally, no crackles or wheezing.  Cardiovascular: Regular rate and rhythm  Abdomen: Gravid. Non-tender  Genitourinary: No urethral discharge, normal external genitalia, no hernia.  Neurologic: Awake, alert, oriented to name, place and time.  Cranial nerves II-XII are grossly intact.    Psychiatric: Normal affect, mood  Skin: No rashes, erythema, pallor, petechia or purpura.     Cervix exam by RN:   Membranes: SROM at 0200 on 4/14, clear fluid   Dilation: 1   Effacement: 70%   Station:-3    Presentation:Cephalic  Fetal Heart Rate Tracing: reactive and reassuring  Tocometer: external monitor                       Assessment:   Anali Lomas is a 40w1d pregnant female admitted with SROM in early labor  2. GBS+  3. Anxiety and OCD, not on meds        Plan:   Admit - see IP orders  Pitocin for labor augmentation if needed  Pain medication/epidural prn  Prophylactic antibiotic for + GBS status    Amanda Shaver MD

## 2021-04-15 PROCEDURE — 250N000013 HC RX MED GY IP 250 OP 250 PS 637: Performed by: PHYSICIAN ASSISTANT

## 2021-04-15 PROCEDURE — 250N000013 HC RX MED GY IP 250 OP 250 PS 637: Performed by: STUDENT IN AN ORGANIZED HEALTH CARE EDUCATION/TRAINING PROGRAM

## 2021-04-15 PROCEDURE — 120N000012 HC R&B POSTPARTUM

## 2021-04-15 PROCEDURE — 250N000013 HC RX MED GY IP 250 OP 250 PS 637: Performed by: OBSTETRICS & GYNECOLOGY

## 2021-04-15 RX ORDER — SIMETHICONE 80 MG
160 TABLET,CHEWABLE ORAL EVERY 6 HOURS PRN
Status: DISCONTINUED | OUTPATIENT
Start: 2021-04-15 | End: 2021-04-16 | Stop reason: HOSPADM

## 2021-04-15 RX ORDER — OXYCODONE AND ACETAMINOPHEN 5; 325 MG/1; MG/1
1 TABLET ORAL EVERY 6 HOURS PRN
Status: DISCONTINUED | OUTPATIENT
Start: 2021-04-15 | End: 2021-04-16 | Stop reason: HOSPADM

## 2021-04-15 RX ADMIN — SIMETHICONE 160 MG: 80 TABLET, CHEWABLE ORAL at 03:22

## 2021-04-15 RX ADMIN — OXYCODONE HYDROCHLORIDE AND ACETAMINOPHEN 1 TABLET: 5; 325 TABLET ORAL at 08:31

## 2021-04-15 RX ADMIN — ACETAMINOPHEN 650 MG: 325 TABLET, FILM COATED ORAL at 06:25

## 2021-04-15 RX ADMIN — OXYCODONE HYDROCHLORIDE AND ACETAMINOPHEN 1 TABLET: 5; 325 TABLET ORAL at 02:29

## 2021-04-15 RX ADMIN — IBUPROFEN 800 MG: 400 TABLET ORAL at 21:23

## 2021-04-15 RX ADMIN — IBUPROFEN 800 MG: 400 TABLET ORAL at 06:25

## 2021-04-15 RX ADMIN — SIMETHICONE 160 MG: 80 TABLET, CHEWABLE ORAL at 13:06

## 2021-04-15 RX ADMIN — IBUPROFEN 800 MG: 400 TABLET ORAL at 12:47

## 2021-04-15 RX ADMIN — SENNOSIDES AND DOCUSATE SODIUM 1 TABLET: 8.6; 5 TABLET ORAL at 21:23

## 2021-04-15 RX ADMIN — ACETAMINOPHEN 650 MG: 325 TABLET, FILM COATED ORAL at 00:29

## 2021-04-15 RX ADMIN — ACETAMINOPHEN 650 MG: 325 TABLET, FILM COATED ORAL at 12:47

## 2021-04-15 RX ADMIN — SENNOSIDES AND DOCUSATE SODIUM 2 TABLET: 8.6; 5 TABLET ORAL at 08:22

## 2021-04-15 RX ADMIN — IBUPROFEN 800 MG: 400 TABLET ORAL at 00:29

## 2021-04-15 RX ADMIN — OXYCODONE HYDROCHLORIDE AND ACETAMINOPHEN 1 TABLET: 5; 325 TABLET ORAL at 21:35

## 2021-04-15 NOTE — LACTATION NOTE
Routine Lactation visit with Estela, significant other Narinder & baby boy Eduardo. Estela reports feeding is going ok but shared her nipples are getting sore. Baby has been sleepy at times with latching. At time of visit, baby ready to feed. LC assisted to undress him and checked his suck with gloved finger. Noted he was pushing finger out of mouth initially with uncoordinated suck. With gentle tongue stimulation, able to develop a more nutritive suck pattern. Mouth otherwise feels normal. Able to extend tongue to lower lip.    Placed baby at right breast in football hold. LC assisted to flange lower lip down and out to ensure a deep comfortable latch. Had to try several times to get him on deeply. Noted nipple getting pushed out of mouth with suck initally and Estela shared it felt pinchy. After adjustment and several tries, able to get a deeper latch. Estela felt latch was much more comfortable. He developed a nutritive suck pattern, continued to feed about 20 minutes, before becoming sleepy. Estela & Narinder both working together to keep him latched deeply throughout feeding. Since sleepy, encouraged to take a break to burp, then feed on other side if baby is interested.    Reviewed milk supply and engorgement. General questions answered regarding how to know if baby is getting enough, and how to know baby is done with a feeding. Breastfeeding section reviewed in Your Guide to Postpartum &  Care. Discussed typical  feeding patterns, cluster feeding, and ways to wake a sleepy baby for feedings.    General questions answered regarding pumping, when it's helpful and necessary, general recommendation to wait to start pumping until breastfeeding is well established. Discussed introducing a bottle and recommendation to wait for bottle introduction for 3-4 weeks unless baby needs to supplement for medical reasons.    Feeding plan: Recommend unlimited, frequent breast feedings: At least 8 - 12 times every 24 hours. Avoid  pacifiers and supplementation with formula unless medically indicated. Encouraged use of feeding log and to record feedings, and void/stool patterns. Estela has a pump for home use.  Encouraged to call with needs, will revisit as needed. Estela & Narinder very appreciative of visit & Lactation support.    Leesa Metcalf, RN-C, IBCLC, MNN, PHN, BSN

## 2021-04-15 NOTE — PLAN OF CARE
Pt  and infant arrived on floor at 1950. Report received from L&D RN Marianela ENCARNACION in rm 423. Pt and  oriented to room, infant and safe sleep and bulb syringe use addressed. Baby bands verified. All questions answered. Will continue to monitor.

## 2021-04-15 NOTE — ANESTHESIA POSTPROCEDURE EVALUATION
Patient: Anali Lomas    * No procedures listed *    Diagnosis:* No pre-op diagnosis entered *  Diagnosis Additional Information: No value filed.    Anesthesia Type:  No value filed.    Note:  Disposition: Inpatient   Postop Pain Control: Uneventful            Sign Out: Well controlled pain   PONV: No   Neuro/Psych: Uneventful            Sign Out: Acceptable/Baseline neuro status   Airway/Respiratory: Uneventful            Sign Out: Acceptable/Baseline resp. status   CV/Hemodynamics: Uneventful            Sign Out: Acceptable CV status   Other NRE: NONE   DID A NON-ROUTINE EVENT OCCUR? No    Event details/Postop Comments:  Patient doing well, ambulating without difficulty, denies any HA, paresthesias or complications associated with the epidural.           Last vitals:  Vitals:    04/15/21 0734 04/15/21 1247 04/15/21 1600   BP: 112/69  129/66   Pulse: 90  97   Resp: 18 18 18   Temp: 36.4  C (97.6  F)  36.8  C (98.3  F)   SpO2:          Last vitals prior to Anesthesia Care Transfer:      Electronically Signed By: Cody Brizuela MD  April 15, 2021  4:12 PM

## 2021-04-15 NOTE — PLAN OF CARE
Data: Anali Lomas transferred to 423 via wheelchair at 1950. Baby transferred via parent's arms.  Action: Receiving unit notified of transfer: Yes. Patient and family notified of room change. Report given to Melvi XIONG And Alba PLATA RN at 1950. Belongings sent to receiving unit. Accompanied by Registered Nurse. Oriented patient to surroundings. Call light within reach. ID bands double-checked with receiving RN.  Response: Patient tolerated transfer and is stable.

## 2021-04-15 NOTE — PLAN OF CARE
VSS. Pt c/o perineal pain that was not relieved by tylenol and motrin. Upon assessment of her bottom, a golf ball sized hematoma was noted at the top of the perineum. MD Potter notified and orders for additional medication received. Hot packs given for abdominal cramping. Pt voiding independently and passing gas. Breastfeeding going fair to well with a nipple shield. Pt and  becoming more independent with cares. Will continue to monitor.

## 2021-04-15 NOTE — PROGRESS NOTES
[facility]       DAILY NOTE - POSTPARTUM DAY 1     SUBJECTIVE:     Pain controlled? Yes  Tolerating a regular diet? YES  Ambulating? YES  Voiding without difficulty? Yes    OBJECTIVE:  Vitals:    21 2000 04/15/21 0029 04/15/21 0734 04/15/21 1247   BP: 125/79 (!) 134/91 112/69    BP Location:   Right arm    Pulse: 117 96 90    Resp: 16 16 18 18   Temp: 98.5  F (36.9  C) 97.9  F (36.6  C) 97.6  F (36.4  C)    TempSrc: Oral Axillary Oral    SpO2:       Weight:       Height:           Constitutional: healthy, alert and no distress    Abdomen:  Uterine fundus is firm, non-tender and at the level of the umbilicus     Perineum: Well approximated, area of swelling in the perineum where stitches placed, grape sized swelling- does not extend     LABS:  Hemoglobin   Date Value Ref Range Status   2020 14 11.7 - 15.7 g/dL Final   2019 14.5 11.7 - 15.7 g/dL Final       ASSESSMENT:  Post-partum day #1 s/p   Pregnancy complicated by NO COMPLICATIONS    Doing well.- Small area of swelling overlying stitches in the perineum but no significant hematoma.      PLAN:   Continue routine postpartum cares. Encouraged sitz baths, ice and tucks pads to the area.     Amanda Shaver MD

## 2021-04-15 NOTE — PROVIDER NOTIFICATION
04/15/21 0239   Provider Notification   Provider Name/Title Dr Potter   Method of Notification Phone   Request Evaluate-Remote   Notification Reason Medication Request     MD notified about pts increased pain and discomfort. Pt complaining of perineum pressure (golf ball sized hematoma) and abdominal discomfort (gas pain). MD would like percocet 325 Q6hr and simethicone 160 Q6hrs to be ordered. Telephone with read back.

## 2021-04-15 NOTE — PLAN OF CARE
VSS Grape sized hematoma on perineum. Pt using Percocet, Ibuprofen, and tylenol for pain control. Pt states that she is feeling better. Using simethicone for gas. Breastfeeding on demand, latching well with a nipple shield.IV discontinued.

## 2021-04-16 VITALS
HEIGHT: 64 IN | TEMPERATURE: 97.6 F | RESPIRATION RATE: 18 BRPM | HEART RATE: 95 BPM | DIASTOLIC BLOOD PRESSURE: 76 MMHG | SYSTOLIC BLOOD PRESSURE: 115 MMHG | OXYGEN SATURATION: 97 % | WEIGHT: 199 LBS | BODY MASS INDEX: 33.97 KG/M2

## 2021-04-16 PROCEDURE — 250N000013 HC RX MED GY IP 250 OP 250 PS 637: Performed by: STUDENT IN AN ORGANIZED HEALTH CARE EDUCATION/TRAINING PROGRAM

## 2021-04-16 PROCEDURE — 250N000013 HC RX MED GY IP 250 OP 250 PS 637: Performed by: OBSTETRICS & GYNECOLOGY

## 2021-04-16 RX ORDER — IBUPROFEN 800 MG/1
800 TABLET, FILM COATED ORAL EVERY 6 HOURS PRN
Qty: 30 TABLET | Refills: 0 | Status: SHIPPED | OUTPATIENT
Start: 2021-04-16 | End: 2024-01-09

## 2021-04-16 RX ORDER — AMOXICILLIN 250 MG
2 CAPSULE ORAL 2 TIMES DAILY
Qty: 60 TABLET | Refills: 0 | Status: SHIPPED | OUTPATIENT
Start: 2021-04-16 | End: 2024-01-09

## 2021-04-16 RX ORDER — ACETAMINOPHEN 325 MG/1
650 TABLET ORAL EVERY 4 HOURS PRN
Qty: 30 TABLET | Refills: 0 | Status: SHIPPED | OUTPATIENT
Start: 2021-04-16 | End: 2024-01-09

## 2021-04-16 RX ORDER — OXYCODONE AND ACETAMINOPHEN 5; 325 MG/1; MG/1
1 TABLET ORAL EVERY 6 HOURS PRN
Qty: 12 TABLET | Refills: 0 | Status: SHIPPED | OUTPATIENT
Start: 2021-04-16 | End: 2023-05-05

## 2021-04-16 RX ADMIN — OXYCODONE HYDROCHLORIDE AND ACETAMINOPHEN 1 TABLET: 5; 325 TABLET ORAL at 03:12

## 2021-04-16 RX ADMIN — IBUPROFEN 800 MG: 400 TABLET ORAL at 09:19

## 2021-04-16 RX ADMIN — IBUPROFEN 800 MG: 400 TABLET ORAL at 03:12

## 2021-04-16 RX ADMIN — ACETAMINOPHEN 650 MG: 325 TABLET, FILM COATED ORAL at 09:19

## 2021-04-16 RX ADMIN — SENNOSIDES AND DOCUSATE SODIUM 2 TABLET: 8.6; 5 TABLET ORAL at 09:18

## 2021-04-16 NOTE — PLAN OF CARE
VSS Pt using Ibuprofen and tylenol for pain control. Pt states that she is feeling better than she did yesterday. Up independently in the room. Breastfeeding on demand, latching well with a nipple shield. Discharging to home with infant and  later today. Prescriptions being filled here at [facility].

## 2021-04-16 NOTE — PLAN OF CARE
Vital signs stable. Postpartum assessment WDL. Patient voiding without difficulty. Able to ambulate in room free of dizziness. Taking Ibuprofen and Percocet for pain management. Using heating pads for comfort. Pain in hematoma area improving.  Working on breastfeeding infant every 2-3 hours using a nipple shield and supplementing infant with formula. Encouraged to call with questions/concerns. Will continue to monitor.

## 2021-04-16 NOTE — LACTATION NOTE
Routine visit with Estela, FOB and baby boy.    Breastfeeding general information reviewed.   Advised to breastfeed on demand 8-12x/day or sooner if baby cues.  Getting ready for discharge.  Plan: Watch for feeding cues and feed every 2-3 hours and/or on demand. Continue to use feeding log to track intake and appropriate voids and stools. Take feeding log to first follow up appointment or weight check. Encourage skin to skin to promote frequent feedings, thermoregulation and bonding. Follow-up with healthcare provider or lactation consultant for questions or concerns.     Instructed on signs/symptoms of engorgement/ plugged ducts and mastitis.  Instructed on comfort measures and when to call MD.  Continues to nurse well per mom. No further questions at this time.   Will follow as needed.   May Vigil BSN, RN, PHN, RNC-MNN, IBCLC

## 2021-04-16 NOTE — PLAN OF CARE
Fundus firm and bleeding wnl.  VSS.  Voiding without difficulty.  Vaginal hematoma pain has lessoned. Using nipple shield for smooth and tender nipples. Taking Percocet and ibuprofen with good relief.  Up independently.  Using ice and tucks.  Encouraged to call with questions or concerns.

## 2021-04-16 NOTE — DISCHARGE SUMMARY
[facility] Discharge Summary    Anali Lomas MRN# [id number]   Age: 25 year old YOB: 1995     Date of Admission:  2021  Date of Discharge::  2021   Admitting Physician:  Amanda Shaver MD  Discharge Physician:  Ericka Salazar MD     Home clinic: [facility]          Admission Diagnoses:   SROM, early labor at 40 1/7wga           Discharge Diagnosis:   S/p            Procedures:   Procedure(s): Spontaneous vaginal delivery              Medications Prior to Admission:     Medications Prior to Admission   Medication Sig Dispense Refill Last Dose     Prenatal Vit-Fe Fumarate-FA (PRENATAL VITAMIN PO) Take 1 tablet by mouth daily   Past Week at 0800             Discharge Medications:     Current Discharge Medication List      CONTINUE these medications which have NOT CHANGED    Details   Prenatal Vit-Fe Fumarate-FA (PRENATAL VITAMIN PO) Take 1 tablet by mouth daily                   Consultations:   No consultations were requested during this admission          Brief History of Labor:   The patient is a 25 year old  who presented at 40w1d estimated gestational age after rupture of membranes at home. She was dilated 1cm on arrival and labor was augmented with pitocin. FHT was category 1 throughout labor.            Hospital Course:   The patient's hospital course was unremarkable.  She recovered as anticipated and experienced no post-operative complications.  Upon discharge, her pain was well controlled. Vaginal bleeding is mild to moderate.  Voiding without difficulty.  Ambulating well and tolerating a normal diet.  No fever or significant wound drainage.  Breastfeeding well.  Infant is stable.  She had a bowel movement prior to discharge.  She was discharged on post-partum day #2.    Post-partum hemoglobin:   Hemoglobin   Date Value Ref Range Status   2020 14 11.7 - 15.7 g/dL Final             Discharge Instructions and Follow-Up:   Discharge diet: Regular    Discharge activity: Activity as tolerated, no lifting greater than 10 lbs   Discharge follow-up: Follow up with consultant in 6 weeks for post partum visit   Wound care: Keep wound clean and dry           Discharge Disposition:   Discharged to home      Attestation:  I have reviewed today's vital signs, notes, medications, labs and imaging.  Amount of time performed on this discharge summary: 10 minutes.    Amanda Shaver MD

## 2021-05-17 ENCOUNTER — MEDICAL CORRESPONDENCE (OUTPATIENT)
Dept: HEALTH INFORMATION MANAGEMENT | Facility: CLINIC | Age: 26
End: 2021-05-17

## 2021-09-19 ENCOUNTER — HEALTH MAINTENANCE LETTER (OUTPATIENT)
Age: 26
End: 2021-09-19

## 2022-01-09 ENCOUNTER — HEALTH MAINTENANCE LETTER (OUTPATIENT)
Age: 27
End: 2022-01-09

## 2022-11-20 ENCOUNTER — HEALTH MAINTENANCE LETTER (OUTPATIENT)
Age: 27
End: 2022-11-20

## 2023-02-25 ENCOUNTER — APPOINTMENT (OUTPATIENT)
Dept: GENERAL RADIOLOGY | Facility: CLINIC | Age: 28
End: 2023-02-25
Payer: COMMERCIAL

## 2023-02-25 ENCOUNTER — HOSPITAL ENCOUNTER (EMERGENCY)
Facility: CLINIC | Age: 28
Discharge: HOME | End: 2023-02-25
Payer: COMMERCIAL

## 2023-02-25 VITALS
HEART RATE: 91 BPM | SYSTOLIC BLOOD PRESSURE: 125 MMHG | RESPIRATION RATE: 18 BRPM | BODY MASS INDEX: 25.61 KG/M2 | OXYGEN SATURATION: 98 % | HEIGHT: 64 IN | WEIGHT: 150 LBS | DIASTOLIC BLOOD PRESSURE: 78 MMHG | TEMPERATURE: 98.4 F

## 2023-02-25 DIAGNOSIS — S46.911A STRAIN OF RIGHT SHOULDER, INITIAL ENCOUNTER: ICD-10-CM

## 2023-02-25 PROCEDURE — 99213 OFFICE O/P EST LOW 20 MIN: CPT

## 2023-02-25 PROCEDURE — 73030 X-RAY EXAM OF SHOULDER: CPT | Mod: RT

## 2023-02-25 PROCEDURE — G0463 HOSPITAL OUTPT CLINIC VISIT: HCPCS

## 2023-02-25 ASSESSMENT — ENCOUNTER SYMPTOMS
NECK PAIN: 0
ARTHRALGIAS: 1
ABDOMINAL PAIN: 0
WOUND: 0
BACK PAIN: 0
SHORTNESS OF BREATH: 0
FEVER: 0
JOINT SWELLING: 0

## 2023-02-25 ASSESSMENT — ACTIVITIES OF DAILY LIVING (ADL): ADLS_ACUITY_SCORE: 35

## 2023-02-25 NOTE — ED PROVIDER NOTES
"  History     Chief Complaint   Patient presents with     Arm Injury     This morning, pt was attempting to stop her son from falling down the stairs. In doing so, she herself slid down the stairs landing on her buttocks, with her R arm bend up behind her. Pt reports slight ache but is primarily concerned with difficulty extending arm or lifting arm up. She notices that her arm begins to shake and a \"fluttering in her chest\" when attempting to do so.      HPI  JEISON PEREZ is a 27 year old female who presents urgent care for complaint of right shoulder pain.  Patient reports she was carrying her child and slipped down 2 stairs.  States she caught herself behind her with her right arm.  Since felt a \"tightness\" and soreness in her shoulder and down into her arm since.  States the discomfort is mostly in the posterior aspect of the shoulder.  She states she notices that her arm is shaking when she tries to extend it fully.  She denies any other injuries from the fall.  States she did not hit her head. Denies any significant back pain or other injury.     Allergies:  Allergies   Allergen Reactions     Zithromax [Azithromycin Dihydrate] Rash       Problem List:    Patient Active Problem List    Diagnosis Date Noted     Encounter for induction of labor 04/14/2021     Priority: Medium     Anxiety 02/10/2021     Priority: Medium     OCD (obsessive compulsive disorder) 02/10/2021     Priority: Medium     Mixed hyperlipidemia 09/15/2016     Priority: Medium     Migraine with aura, not intractable, without status migrainosus 09/13/2016     Priority: Medium     exedrine prn          Past Medical History:    Past Medical History:   Diagnosis Date     Depressive disorder        Past Surgical History:    Past Surgical History:   Procedure Laterality Date     wisdom teeth         Family History:    Family History   Problem Relation Age of Onset     Thyroid Disease Mother         with pregnacy     Anxiety Disorder Sister  " "    Brain Cancer Maternal Grandfather         ??     Coronary Artery Disease Paternal Grandfather      Diabetes Type 1 Paternal Aunt        Social History:  Marital Status:   [2]  Social History     Tobacco Use     Smoking status: Never     Smokeless tobacco: Never   Substance Use Topics     Alcohol use: Not Currently     Drug use: No        Medications:    acetaminophen (TYLENOL) 325 MG tablet  ibuprofen (ADVIL/MOTRIN) 800 MG tablet  oxyCODONE-acetaminophen (PERCOCET) 5-325 MG tablet  Prenatal Vit-Fe Fumarate-FA (PRENATAL VITAMIN PO)  senna-docusate (SENOKOT-S/PERICOLACE) 8.6-50 MG tablet          Review of Systems   Constitutional: Negative for fever.   Respiratory: Negative for shortness of breath.    Cardiovascular: Negative for chest pain.   Gastrointestinal: Negative for abdominal pain.   Musculoskeletal: Positive for arthralgias (right shoulder discomfort and tightness). Negative for back pain, joint swelling and neck pain.   Skin: Negative for wound.   All other systems reviewed and are negative.      Physical Exam   BP: 125/78  Pulse: 91  Temp: 98.4  F (36.9  C)  Resp: 18  Height: 162.6 cm (5' 4\")  Weight: 68 kg (150 lb)  SpO2: 98 %      Physical Exam  Constitutional:       Appearance: Normal appearance.   HENT:      Nose: Nose normal.   Cardiovascular:      Rate and Rhythm: Normal rate.      Pulses: Normal pulses.   Pulmonary:      Effort: Pulmonary effort is normal. No respiratory distress.   Musculoskeletal:      Right shoulder: No swelling, deformity, effusion, tenderness, bony tenderness or crepitus. Decreased range of motion. Normal strength. Normal pulse.      Cervical back: Normal range of motion and neck supple. No tenderness.   Skin:     General: Skin is warm.      Capillary Refill: Capillary refill takes less than 2 seconds.      Findings: No bruising or rash.         ED Course                 Procedures              Results for orders placed or performed during the hospital encounter of " "02/25/23 (from the past 24 hour(s))   Shoulder XR, 2 view, right    Narrative    EXAM: XR SHOULDER RIGHT 2 VIEWS  LOCATION: [facility]  DATE/TIME: 2/25/2023 10:38 AM    INDICATION: fall, right shoulder pain.  COMPARISON: None.      Impression    IMPRESSION: Normal joint spaces and alignment. No fracture.       Medications - No data to display    Assessments & Plan (with Medical Decision Making)   Patient reports she was carrying her child and slipped down 2 stairs.  States she caught herself behind her with her right arm.  Since felt a \"tightness\" and soreness in her shoulder and down into her arm since.  States the discomfort is mostly in the posterior aspect of the shoulder.  She states she notices that her arm is shaking when she tries to extend it fully.  She denies any other injuries from the fall.  States she did not hit her head.    X-ray was performed of the right shoulder which did not demonstrate any acute fracture, dislocation or malalignment.  Joint spacing is normal.  Patient's exam is consistent with a strain of the right shoulder or possible shoulder impingement syndrome.  Patient was provided with a sling for comfort to use as needed.  Symptomatic care was reviewed and recommended topical Voltaren gel as needed as well.  She was provided with the number for orthopedics should she have continued pain or worsening pain.  Recommended to advance activity as tolerated.  She should seen sooner for new or worsening symptoms.  Patient denied any other injury today and no further imaging was necessary.    I have reviewed the nursing notes.    I have reviewed the findings, diagnosis, plan and need for follow up with the patient.        Discharge Medication List as of 2/25/2023 11:17 AM          Final diagnoses:   Strain of right shoulder, initial encounter       2/25/2023   [facility] EMERGENCY DEPT    Disclaimer:  This note consists of symbols derived from " keyboarding, dictation and/or voice recognition software.  As a result, there may be errors in the script that have gone undetected.  Please consider this when interpreting information found in this chart.       Vamshi Diaz APRN CNP  02/25/23 1144

## 2023-02-25 NOTE — DISCHARGE INSTRUCTIONS
Tylenol, ibuprofen, rest, ice for symptomatic relief. Wear sling as needed for support/discomfort.     Call ortho for follow-up for continued pain/discomfort: [phone]

## 2023-03-15 ENCOUNTER — TRANSCRIBE ORDERS (OUTPATIENT)
Dept: OTHER | Age: 28
End: 2023-03-15

## 2023-03-15 DIAGNOSIS — M77.11 LATERAL EPICONDYLITIS, RIGHT ELBOW: ICD-10-CM

## 2023-03-15 DIAGNOSIS — M12.811 ROTATOR CUFF TEAR ARTHROPATHY OF RIGHT SHOULDER: Primary | ICD-10-CM

## 2023-03-15 DIAGNOSIS — M75.101 ROTATOR CUFF TEAR ARTHROPATHY OF RIGHT SHOULDER: Primary | ICD-10-CM

## 2023-03-17 ENCOUNTER — HOSPITAL ENCOUNTER (OUTPATIENT)
Dept: PHYSICAL THERAPY | Facility: CLINIC | Age: 28
Setting detail: THERAPIES SERIES
Discharge: HOME | End: 2023-03-17
Attending: ORTHOPAEDIC SURGERY
Payer: COMMERCIAL

## 2023-03-17 DIAGNOSIS — M75.101 ROTATOR CUFF TEAR ARTHROPATHY OF RIGHT SHOULDER: ICD-10-CM

## 2023-03-17 DIAGNOSIS — M12.811 ROTATOR CUFF TEAR ARTHROPATHY OF RIGHT SHOULDER: ICD-10-CM

## 2023-03-17 DIAGNOSIS — M77.11 LATERAL EPICONDYLITIS, RIGHT ELBOW: ICD-10-CM

## 2023-03-17 PROCEDURE — 97161 PT EVAL LOW COMPLEX 20 MIN: CPT | Mod: GP

## 2023-03-17 PROCEDURE — 97110 THERAPEUTIC EXERCISES: CPT | Mod: GP

## 2023-03-17 NOTE — PROGRESS NOTES
03/17/23 0600   General Information   Type of Visit Initial OP Ortho PT Evaluation   Start of Care Date 03/17/23   Referring Physician Luke Meneses MD   Patient/Family Goals Statement Be able to use R arm again, get back to work   Orders Evaluate and Treat   Date of Order 03/13/23   Certification Required? No   Medical Diagnosis Rotator cuff tear arthropathy of right shoulder (M75.101, M12.811)   Lateral epicondylitis, right elbow (M77.11)   Surgical/Medical history reviewed Yes   Precautions/Limitations no known precautions/limitations   Body Part(s)   Body Part(s) Shoulder;Elbow/Wrist   Presentation and Etiology   Pertinent history of current problem (include personal factors and/or comorbidities that impact the POC) Pt reports her two year old was walking down the stairs, she went to stop him from falling, and slipped/fell down the stairs. Fell onto the R arm and had resultant R shoulder and elbow pain. Went to the ER and had an unremarkable XR or her shoulder, MRI at [facility] reveiwed below. Pt has not had any imaging of the R elbow to date. Continues to have soreness and swelling in both areas, difficulty moving the entire arm. Denies numbness or tingling. Presents wearing sling from Orthopedics for R arm, wearing sling nearly all of the time. She is right hand dominant. Orthopedics recommending 4-6 weeks of therapy and then returning for follow up. Denies neck injury or hitting her head. Ice regularly which helps, taking ibuprofen for pain and swelling too which helps a bit. Hx of broken R arm during childhood but no other significant PMH.   Impairments A. Pain;C. Swelling;E. Decreased flexibility;F. Decreased strength and endurance   Functional Limitations perform activities of daily living;perform required work activities   Symptom Location R shoulder and elbow   How/Where did it occur With a fall   Onset date of current episode/exacerbation 02/25/23   Chronicity New   Pain rating (0-10 point scale)  Best (/10);Worst (/10)   Best (/10) 0   Worst (/10) 5   Pain quality B. Dull;C. Aching;D. Burning;E. Shooting   Frequency of pain/symptoms C. With activity   Pain/symptoms exacerbated by C. Lifting;D. Carrying;G. Certain positions;H. Overhead reach;I. Bending;J. ADL;K. Home tasks;L. Work tasks   Pain/symptoms eased by A. Sitting;C. Rest;H. Cold;J. Braces/supports;I. OTC medication(s);F. Certain positions   Progression of symptoms since onset: Worsened   Current / Previous Interventions   Diagnostic Tests: X-ray;MRI   X-ray Results unremarkable   MRI Results Results   MRI results R shoulder impression: No rotator cuff tear or significant tendinopathy, no fracture, intact long head of biceps tendon, small amount of fluid in the subacromial bursa may indicate a very mild subacromial/subdeltoid bursitis in the appropriate clinical setting.   Prior Level of Function   Functional Level Prior Comment Independent with ADLs and mobility prior,  now assisting with many ADLs   Current Level of Function   Patient role/employment history A. 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   Fall Risk Screen   Fall screen completed by PT   Have you fallen 2 or more times in the past year? No   Have you fallen and had an injury in the past year? No   Is patient a fall risk? No   Abuse Screen (yes response referral indicated)   Feels Unsafe at Home or Work/School no   Feels Threatened by Someone no   Does Anyone Try to Keep You From Having Contact with Others or Doing Things Outside Your Home? no   Physical Signs of Abuse Present no   Shoulder Objective Findings   Side (if bilateral, select both right and left) Right   Cervical Screen (ROM, quadrant) Flexion and extension WNL, some stiffness into bilateral side bending   Shoulder ROM Comment L shoulder AROM full and WNL   Shoulder Flexibility Comments L shoulder strength 5/5 B   Neer's Test Unable to assess due to pain    Garg-Chaparro Test Unable to assess due to pain   Shoulder Special Tests Comments Difficulty with assessing RC and impingement special tests due to pt pain, hypersensitivity and significant reduction in ROM   Palpation TTP to R infra, supra and teres minor tendons. TTP to R triceps muscle belly.   Accessory Motion/Joint Mobility Significant muscle guarding, difficut to assess GH mobility   Observation Has shoulder sling donned R side   Integumentary  WNL   Posture Mild increased forward head and shoulders   Right Shoulder Flexion AROM 40* (pain)   Right Shoulder Flexion PROM 130* (pain)   Right Shoulder Abduction AROM 59* (pain)   Right Shoulder Abduction PROM 90* (pain)   Right Shoulder ER AROM Unable due to pain   Right Shoulder ER PROM 20* (pain)   Right Shoulder IR AROM Unable due to pain   Right Shoulder IR PROM 50* (pain)   Right Shoulder Flexion Strength 2/5 (pain)   Right Shoulder Abduction Strength 2/5 (pain)   Right Shoulder ER Strength 3/5 (pain)   Right Shoulder IR Strength 3+/5 (pain)   Elbow/Wrist Objective Findings   Side (if bilateral, select both right and left) Right   Cervical Screen ROM Flexion and extension WNL, some stiffness into bilateral side bending   Elbow/Wrist ROM Comments Wrist flexion and extension AROM/PROM significantly limited on the R   Elbow/Wrist Strength Comments L wrist AROM and strength WNL   Lateral Epicondylitis Test + R   Palpation TTP to R triceps muscle belly, R wrist extensors throughout, proximal tendons of R wrist flexors and extensors. Tenderness throughout ulnar groove.   Accessory Motion/Joint Mobility Difficult to assess due to significant muscle guarding at the elbow.   Observation Shoulder sling donned R UE   Integumentary  WNL   Posture Mild increased forward head and shoulders   Right Elbow Flexion/Extension AROM R Flexion AROM: 110* (pain), extension AROM 45* from neutral (pain). Flexion PROM 132* (pain), extension PROM 15* from neutral (pain). Has  hyperextension L elbow.   Right Biceps Brachii Strength 4-/5 R   Right Triceps Brachii Strength 3+/5 R (pain)   Right Flexor Carpi Radialis Strength 4/5 R (pain)   Right Extensor Carpi Radialis Longus Strength 3/5 R (pain)   Planned Therapy Interventions   Planned Therapy Interventions joint mobilization;manual therapy;neuromuscular re-education;strengthening;ROM;stretching   Planned Modality Interventions   Planned Modality Interventions Biofeedback;Electrical stimulation;Iontophoresis;TENS;Traction;Ultrasound   Clinical Impression   Criteria for Skilled Therapeutic Interventions Met yes, treatment indicated   PT Diagnosis R shoulder and elbow pain   Influenced by the following impairments Impaired AROM/PROM at the shoulder and elbow, significantly reduced shoulder and elbow strength on the R, myofascial restrictions, and hypersensitivity to palpation   Functional limitations due to impairments ADLs, self-cares, work duties, sleeping, reaching, lifting   Clinical Presentation Stable/Uncomplicated   Clinical Presentation Rationale Symptoms are stable   Clinical Decision Making (Complexity) Low complexity   Therapy Frequency 2 times/Week   Predicted Duration of Therapy Intervention (days/wks) 2x/week for 5 weeks, then 1x/week for 5 weeks for 10 weeks total   Risk & Benefits of therapy have been explained Yes   Patient, Family & other staff in agreement with plan of care Yes   Clinical Impression Comments Pt presents for new patient evaluation of R shoulder and R elbow pain after a fall down the stairs on 2/20/23. Pt would benefit from skilled PT services to address impairments and functional limitations to return to PLOF. Prognosis is good due to acuity of symptoms and lack of medical comorbidities. Pt will also be seeing OT for hand and wrist pain.   Education Assessment   Preferred Learning Style Listening;Reading;Demonstration;Pictures/video   Barriers to Learning No barriers   ORTHO GOALS   PT Ortho Eval Goals  1;2;3;4   Ortho Goal 1   Goal Identifier STG   Goal Description Pt will demonstrate ability to reach into overhead cabinets with < 3/10 R arm pain in order to complete household tasks with ease.   Target Date 04/14/23   Ortho Goal 2   Goal Identifier STG   Goal Description Pt will demonstrate ability to don/doff clothing with < 3/10 R arm pain in order to improve dressing tasks.   Target Date 04/14/23   Ortho Goal 3   Goal Identifier LTG   Goal Description Pt will demonstrate ability to lift her son without limitation due to R arm pain in order to care for children independently.   Target Date 05/26/23   Ortho Goal 4   Goal Identifier LTG   Goal Description Pt will be independent with HEP for self management of symptoms within 10 weeks.   Target Date 05/26/23   Total Evaluation Time   PT Eval, Low Complexity Minutes ([id number]) 30     Please contact me with any questions or concerns.   Thank you for this referral.     Vicki Ashton, PT, DPT

## 2023-03-22 ENCOUNTER — HOSPITAL ENCOUNTER (OUTPATIENT)
Dept: PHYSICAL THERAPY | Facility: CLINIC | Age: 28
Setting detail: THERAPIES SERIES
Discharge: HOME | End: 2023-03-22
Attending: ORTHOPAEDIC SURGERY
Payer: COMMERCIAL

## 2023-03-22 PROCEDURE — 97110 THERAPEUTIC EXERCISES: CPT | Mod: GP

## 2023-03-24 ENCOUNTER — HOSPITAL ENCOUNTER (OUTPATIENT)
Dept: PHYSICAL THERAPY | Facility: CLINIC | Age: 28
Setting detail: THERAPIES SERIES
Discharge: HOME | End: 2023-03-24
Attending: ORTHOPAEDIC SURGERY
Payer: COMMERCIAL

## 2023-03-24 PROCEDURE — 97110 THERAPEUTIC EXERCISES: CPT | Mod: GP

## 2023-03-29 ENCOUNTER — HOSPITAL ENCOUNTER (OUTPATIENT)
Dept: PHYSICAL THERAPY | Facility: CLINIC | Age: 28
Setting detail: THERAPIES SERIES
Discharge: HOME | End: 2023-03-29
Attending: ORTHOPAEDIC SURGERY
Payer: COMMERCIAL

## 2023-03-29 PROCEDURE — 97110 THERAPEUTIC EXERCISES: CPT | Mod: GP

## 2023-04-05 ENCOUNTER — HOSPITAL ENCOUNTER (OUTPATIENT)
Dept: PHYSICAL THERAPY | Facility: CLINIC | Age: 28
Setting detail: THERAPIES SERIES
Discharge: HOME | End: 2023-04-05
Attending: ORTHOPAEDIC SURGERY
Payer: COMMERCIAL

## 2023-04-05 PROCEDURE — 97110 THERAPEUTIC EXERCISES: CPT | Mod: GP

## 2023-04-05 NOTE — PROGRESS NOTES
[facility]    Outpatient Physical Therapy Discharge Note  Patient: JEISON PEREZ  : 1995    Beginning/End Dates of Reporting Period:  3/17/23 to 23    Referring Provider: Luke Meneses MD    Therapy Diagnosis: R shoulder and elbow pain     Client Self Report: Pt reports things are continuing to improve, did a workout last week and R shoulder was sore but otherwise functional tasks have been going a lot better. Pt will be returning to work next week. No longer having any hand/wrist or elbow pain. Pt would like today to be last PT session as she has noticed she is nearly pain free with all activities and will be returning to work soon.    Objective Measurements:  Objective Measure: R Shoulder AROM  Details: Flexion 172*, abduction 180*, ER to 85*, IR to T8    Objective Measure: R shoulder strength  Details: 5/5 flexion, abduction, IR and ER    Objective Measure: R elbow strength  Details: 5/5 flexion and extension    Objective Measure: Pain  Details: 0/10 pain in R arm     Goals:  Goal Identifier STG   Goal Description Pt will demonstrate ability to reach into overhead cabinets with < 3/10 R arm pain in order to complete household tasks with ease.   Target Date 23   Date Met  23   Progress (detail required for progress note): MET     Goal Identifier STG   Goal Description Pt will demonstrate ability to don/doff clothing with < 3/10 R arm pain in order to improve dressing tasks.   Target Date 23   Date Met  23   Progress (detail required for progress note): MET     Goal Identifier LTG   Goal Description Pt will demonstrate ability to lift her son without limitation due to R arm pain in order to care for children independently.   Target Date 23   Date Met  23   Progress (detail required for progress note): MET     Goal Identifier LTG   Goal Description Pt  will be independent with HEP for self management of symptoms within 10 weeks.   Target Date 05/26/23   Date Met  04/05/23   Progress (detail required for progress note): MET     Plan:  Discharge from therapy.    Discharge:    Reason for Discharge: Patient has met all goals.    Discharge Plan: Patient to continue home program.    Please contact me with any questions or concerns.   Thank you for this referral.     Vicki Ashton, PT, DPT

## 2023-04-15 ENCOUNTER — HEALTH MAINTENANCE LETTER (OUTPATIENT)
Age: 28
End: 2023-04-15

## 2023-05-04 ASSESSMENT — ENCOUNTER SYMPTOMS
ARTHRALGIAS: 0
HEMATOCHEZIA: 0
NERVOUS/ANXIOUS: 0
MYALGIAS: 0
BREAST MASS: 0
NAUSEA: 0
HEARTBURN: 0
ABDOMINAL PAIN: 0
PALPITATIONS: 0
JOINT SWELLING: 0
CONSTIPATION: 0
HEADACHES: 0
DIZZINESS: 0
DIARRHEA: 0
FEVER: 0
EYE PAIN: 0
HEMATURIA: 0
SORE THROAT: 0
PARESTHESIAS: 0
WEAKNESS: 0
FREQUENCY: 0
CHILLS: 0
SHORTNESS OF BREATH: 0
DYSURIA: 0
COUGH: 0

## 2023-05-05 ENCOUNTER — OFFICE VISIT (OUTPATIENT)
Dept: FAMILY MEDICINE | Facility: CLINIC | Age: 28
End: 2023-05-05
Payer: COMMERCIAL

## 2023-05-05 VITALS
HEIGHT: 64 IN | SYSTOLIC BLOOD PRESSURE: 118 MMHG | RESPIRATION RATE: 16 BRPM | TEMPERATURE: 98.3 F | HEART RATE: 75 BPM | WEIGHT: 139 LBS | BODY MASS INDEX: 23.73 KG/M2 | DIASTOLIC BLOOD PRESSURE: 74 MMHG | OXYGEN SATURATION: 96 %

## 2023-05-05 DIAGNOSIS — Z11.59 NEED FOR HEPATITIS C SCREENING TEST: ICD-10-CM

## 2023-05-05 DIAGNOSIS — Z12.4 CERVICAL CANCER SCREENING: ICD-10-CM

## 2023-05-05 DIAGNOSIS — K59.00 CONSTIPATION, UNSPECIFIED CONSTIPATION TYPE: ICD-10-CM

## 2023-05-05 DIAGNOSIS — Z00.00 ROUTINE GENERAL MEDICAL EXAMINATION AT A HEALTH CARE FACILITY: Primary | ICD-10-CM

## 2023-05-05 DIAGNOSIS — L98.9 SKIN LESION: ICD-10-CM

## 2023-05-05 PROCEDURE — G0145 SCR C/V CYTO,THINLAYER,RESCR: HCPCS | Performed by: STUDENT IN AN ORGANIZED HEALTH CARE EDUCATION/TRAINING PROGRAM

## 2023-05-05 PROCEDURE — 99395 PREV VISIT EST AGE 18-39: CPT | Performed by: STUDENT IN AN ORGANIZED HEALTH CARE EDUCATION/TRAINING PROGRAM

## 2023-05-05 PROCEDURE — 99213 OFFICE O/P EST LOW 20 MIN: CPT | Mod: 25 | Performed by: STUDENT IN AN ORGANIZED HEALTH CARE EDUCATION/TRAINING PROGRAM

## 2023-05-05 RX ORDER — POLYETHYLENE GLYCOL 3350 17 G/17G
1 POWDER, FOR SOLUTION ORAL DAILY
Qty: 507 G | Refills: 0 | Status: SHIPPED | OUTPATIENT
Start: 2023-05-05 | End: 2024-01-09

## 2023-05-05 ASSESSMENT — ENCOUNTER SYMPTOMS
DIARRHEA: 0
NAUSEA: 0
MYALGIAS: 0
DIZZINESS: 0
SHORTNESS OF BREATH: 0
PARESTHESIAS: 0
WEAKNESS: 0
DYSURIA: 0
ABDOMINAL PAIN: 0
NERVOUS/ANXIOUS: 0
HEADACHES: 0
BREAST MASS: 0
HEMATOCHEZIA: 0
CONSTIPATION: 0
CHILLS: 0
HEMATURIA: 0
JOINT SWELLING: 0
FEVER: 0
FREQUENCY: 0
SORE THROAT: 0
ARTHRALGIAS: 0
COUGH: 0
HEARTBURN: 0
PALPITATIONS: 0
EYE PAIN: 0

## 2023-05-05 ASSESSMENT — PAIN SCALES - GENERAL: PAINLEVEL: NO PAIN (0)

## 2023-05-05 NOTE — PATIENT INSTRUCTIONS
Hello! It was a pleasure seeing you today. Just some things we discussed at today's visit:     Constipation    Rx sent for miralax to your pharmacy   In addition to Miralax you can also take Colace or Docusate as needed   Continue to keep eating foods rich in fiber   Moles   Follow up with dermatology       Sincerely,     Marielos Kaiser MD

## 2023-05-05 NOTE — PROGRESS NOTES
SUBJECTIVE:   CC: Estela is an 27 year old who presents for preventive health visit.       5/5/2023     6:49 AM   Additional Questions   Roomed by Ivet FONTANA LPN   Accompanied by self         5/5/2023     6:49 AM   Patient Reported Additional Medications   Patient reports taking the following new medications no new meds     Patient has been advised of split billing requirements and indicates understanding: Yes  Healthy Habits:     Getting at least 3 servings of Calcium per day:  Yes    Bi-annual eye exam:  Yes    Dental care twice a year:  NO    Sleep apnea or symptoms of sleep apnea:  Daytime drowsiness    Diet:  Regular (no restrictions)    Frequency of exercise:  2-3 days/week    Duration of exercise:  30-45 minutes    Taking medications regularly:  Not Applicable    Barriers to taking medications:  None    Medication side effects:  Not applicable    PHQ-2 Total Score: 0    Additional concerns today:  Yes (mole check and would like to know how to manage occasional constipation that causes her some discomfort)      Today's PHQ-2 Score:       5/4/2023     9:42 AM   PHQ-2 ( 1999 [employer])   Q1: Little interest or pleasure in doing things 0   Q2: Feeling down, depressed or hopeless 0   PHQ-2 Score 0   Q1: Little interest or pleasure in doing things Not at all   Q2: Feeling down, depressed or hopeless Not at all   PHQ-2 Score 0       Have you ever done Advance Care Planning? (For example, a Health Directive, POLST, or a discussion with a medical provider or your loved ones about your wishes): No, advance care planning information given to patient to review.  Advanced care planning was discussed at today's visit.    Social History     Tobacco Use     Smoking status: Never     Smokeless tobacco: Never   Vaping Use     Vaping status: Never Used   Substance Use Topics     Alcohol use: Yes             5/4/2023     9:41 AM   Alcohol Use   Prescreen: >3 drinks/day or >7 drinks/week? No     Reviewed orders with patient.   "Reviewed health maintenance and updated orders accordingly - Yes    Breast Cancer Screenin/4/2023     9:43 AM   Breast CA Risk Assessment (FHS-7)   Do you have a family history of breast, colon, or ovarian cancer? No / Unknown       Patient under 40 years of age: Routine Mammogram Screening not recommended.   Pertinent mammograms are reviewed under the imaging tab.    History of abnormal Pap smear: NO - age 21-29 PAP every 3 years recommended     Reviewed and updated as needed this visit by clinical staff   Tobacco  Allergies  Meds              Reviewed and updated as needed this visit by Provider                   Review of Systems   Constitutional: Negative for chills and fever.   HENT: Negative for congestion, ear pain, hearing loss and sore throat.    Eyes: Negative for pain and visual disturbance.   Respiratory: Negative for cough and shortness of breath.    Cardiovascular: Negative for chest pain, palpitations and peripheral edema.   Gastrointestinal: Negative for abdominal pain, constipation, diarrhea, heartburn, hematochezia and nausea.   Breasts:  Negative for tenderness, breast mass and discharge.   Genitourinary: Positive for vaginal discharge. Negative for dysuria, frequency, genital sores, hematuria, pelvic pain, urgency and vaginal bleeding.   Musculoskeletal: Negative for arthralgias, joint swelling and myalgias.   Skin: Negative for rash.   Neurological: Negative for dizziness, weakness, headaches and paresthesias.   Psychiatric/Behavioral: Negative for mood changes. The patient is not nervous/anxious.         OBJECTIVE:   /74 (BP Location: Right arm, Patient Position: Sitting, Cuff Size: Adult Regular)   Pulse 75   Temp 98.3  F (36.8  C) (Tympanic)   Resp 16   Ht 1.626 m (5' 4\")   Wt 63 kg (139 lb)   LMP 2023 (Approximate)   SpO2 96%   Breastfeeding No   BMI 23.86 kg/m    Physical Exam  Constitutional:       General: She is not in acute distress.  HENT:      Head: " Normocephalic and atraumatic.      Right Ear: External ear normal.      Left Ear: External ear normal.      Mouth/Throat:      Mouth: Mucous membranes are moist.      Pharynx: Oropharynx is clear. No oropharyngeal exudate or posterior oropharyngeal erythema.   Eyes:      Extraocular Movements: Extraocular movements intact.   Cardiovascular:      Rate and Rhythm: Normal rate and regular rhythm.      Heart sounds: Normal heart sounds.   Pulmonary:      Effort: Pulmonary effort is normal. No respiratory distress.      Breath sounds: Normal breath sounds. No wheezing or rhonchi.   Abdominal:      Palpations: Abdomen is soft. There is no mass.      Tenderness: There is no abdominal tenderness.   Musculoskeletal:         General: No deformity. Normal range of motion.      Cervical back: Normal range of motion and neck supple.   Skin:     General: Skin is warm.      Comments: Stuck on appearing lesion on her back   Large pink not firm growth on her parietal scalp on the right side that has hair growing out of it    Neurological:      General: No focal deficit present.      Mental Status: She is alert and oriented to person, place, and time.   Psychiatric:         Mood and Affect: Mood normal.       Diagnostic Test Results:  No recent labs on file     ASSESSMENT/PLAN:     1. Routine general medical examination at a health care facility  2. Need for hepatitis C screening test  > patient with no history of IVDU and has only had one sexual partner since she was 17, low risk for Hep C, can hold off on screening at today's visit     3. Cervical cancer screening  - Pap Screen reflex to HPV if ASCUS - recommend age 25 - 29    4. Skin lesion  > 2 skin lesions one on back that looks like seborrheic keratosis and one on her right parietal scalp that looks like a very large skin tag, but there is hair growing out of it   - Adult Dermatology Referral; Future    5. Constipation, unspecified constipation type  - polyethylene glycol  "(MIRALAX) 17 GM/Dose powder; Take 17 g (1 capful.) by mouth daily  Dispense: 507 g; Refill: 0  - encouraged incsreased fiber intake   - patient aware she can use colace and/or docusate if her symptoms do not improve with miralax and diet alone        Patient has been advised of split billing requirements and indicates understanding: Yes      COUNSELING:  Reviewed preventive health counseling, as reflected in patient instructions      BMI:   Estimated body mass index is 23.86 kg/m  as calculated from the following:    Height as of this encounter: 1.626 m (5' 4\").    Weight as of this encounter: 63 kg (139 lb).         She reports that she has never smoked. She has never used smokeless tobacco.          CHANEL CORDOVA MD  [facility]  "

## 2023-05-09 ENCOUNTER — HOSPITAL ENCOUNTER (EMERGENCY)
Facility: CLINIC | Age: 28
Discharge: HOME | End: 2023-05-09
Attending: EMERGENCY MEDICINE | Admitting: EMERGENCY MEDICINE
Payer: COMMERCIAL

## 2023-05-09 VITALS
HEIGHT: 64 IN | WEIGHT: 134 LBS | RESPIRATION RATE: 13 BRPM | DIASTOLIC BLOOD PRESSURE: 77 MMHG | OXYGEN SATURATION: 95 % | HEART RATE: 106 BPM | BODY MASS INDEX: 22.88 KG/M2 | TEMPERATURE: 96.3 F | SYSTOLIC BLOOD PRESSURE: 112 MMHG

## 2023-05-09 DIAGNOSIS — R07.9 CHEST PAIN, UNSPECIFIED TYPE: ICD-10-CM

## 2023-05-09 DIAGNOSIS — S16.1XXA STRAIN OF NECK MUSCLE, INITIAL ENCOUNTER: ICD-10-CM

## 2023-05-09 PROCEDURE — 93010 ELECTROCARDIOGRAM REPORT: CPT | Performed by: EMERGENCY MEDICINE

## 2023-05-09 PROCEDURE — 99283 EMERGENCY DEPT VISIT LOW MDM: CPT | Performed by: EMERGENCY MEDICINE

## 2023-05-09 PROCEDURE — 93005 ELECTROCARDIOGRAM TRACING: CPT | Performed by: EMERGENCY MEDICINE

## 2023-05-09 PROCEDURE — 99283 EMERGENCY DEPT VISIT LOW MDM: CPT | Mod: 25 | Performed by: EMERGENCY MEDICINE

## 2023-05-09 ASSESSMENT — ACTIVITIES OF DAILY LIVING (ADL)
ADLS_ACUITY_SCORE: 35
ADLS_ACUITY_SCORE: 35

## 2023-05-09 NOTE — ED PROVIDER NOTES
"  History     Chief Complaint   Patient presents with     Panic Attack     Palpitations     HPI  Anali Sorto is a 27 year old female with history notable for anxiety, obsessive consulted disorder, hyperlipidemia, with chest, neck, back pain associated with palpitations and worsening anxiety.  She is concerned this could be an anxiety attack but is unsure.  Started with trapezius on Saturday.  Pain seems to be positional and exacerbated by movements or pressure.  It is mild in severity.  Does not recall any injury or strain.  No fevers.  She also reports a \"zinger\" pain in her left anterior chest is only lasts 1 to 2 seconds and seems to come randomly with no obvious provocative or palliating features.  This worsens her anxiety.  Has been occurring multiple times per day.  Today while at lunch she felt tightness in her chest after swallowing which lasted approximately 20 seconds.  Has not recurred.  No exertional dyspnea.  No fever or cough.  Also has had intermittent pain in her left elbow and proximal forearm.  This is unrelated to the pain in her neck and left trapezius as far as onset of symptoms.  She does report nausea.    The patient's PMHx, Surgical Hx, Allergies, and Medications were all reviewed with the patient.    Allergies:  Allergies   Allergen Reactions     Zithromax [Azithromycin Dihydrate] Rash       Problem List:    Patient Active Problem List    Diagnosis Date Noted     Encounter for induction of labor 04/14/2021     Priority: Medium     Anxiety 02/10/2021     Priority: Medium     OCD (obsessive compulsive disorder) 02/10/2021     Priority: Medium     Mixed hyperlipidemia 09/15/2016     Priority: Medium     Migraine with aura, not intractable, without status migrainosus 09/13/2016     Priority: Medium     exedrine prn          Past Medical History:    Past Medical History:   Diagnosis Date     Depressive disorder        Past Surgical History:    Past Surgical History:   Procedure " "Laterality Date     wisdom teeth         Family History:    Family History   Problem Relation Age of Onset     Thyroid Disease Mother         with pregnacy     Anxiety Disorder Sister      Brain Cancer Maternal Grandfather         ??     Other Cancer Maternal Grandfather      Coronary Artery Disease Paternal Grandfather      Diabetes Type 1 Paternal Aunt      Diabetes Other         Aunt     Other Cancer Other         Uncle     Anxiety Disorder Sister        Social History:  Marital Status:   [2]  Social History     Tobacco Use     Smoking status: Never     Smokeless tobacco: Never   Vaping Use     Vaping status: Never Used   Substance Use Topics     Alcohol use: Yes     Drug use: Yes     Types: Marijuana        Medications:    acetaminophen (TYLENOL) 325 MG tablet  ibuprofen (ADVIL/MOTRIN) 800 MG tablet  polyethylene glycol (MIRALAX) 17 GM/Dose powder  senna-docusate (SENOKOT-S/PERICOLACE) 8.6-50 MG tablet          Review of Systems  Pertinent positives and negatives mentioned in HPI    Physical Exam   BP: (!) 140/106  Pulse: 106  Temp: (!) 96.3  F (35.7  C)  Resp: 18  Height: 162.6 cm (5' 4\")  Weight: 60.8 kg (134 lb)  SpO2: 99 %    Physical Exam  GEN: Awake, alert, and cooperative.  Appears anxious but not acutely distressed  HENT: MMM. External ears and nose normal bilaterally.  Atraumatic  EYES: EOM intact. Conjunctiva clear. No discharge.  No RAPD  NECK: Symmetric, freely mobile.  No midline tenderness.  Mild left paraspinal tenderness extending into left trapezius  CV : Regular rate and rhythm.  No murmurs appreciated  PULM: Normal effort. No wheezes, rales, or rhonchi bilaterally.  ABD: Soft, non-tender, non-distended. No rebound or guarding.   NEURO: Normal speech. Following commands. CN II-XII grossly intact. Answering questions and interacting appropriately.   EXT: No gross deformity. Warm and well perfused.  No discomfort with passive range of motion or palpation of left shoulder.  No tenderness to " palpation of left elbow.  No discomfort with pronation supination, flexion, or extension.  No erythema or ecchymosis.  No abrasions.  INT: Warm. No diaphoresis. Normal color.        ED Course        Procedures         EKG: Interpreted by myself.  Sinus rhythm with rate of 78 bpm.  Normal axis.  Normal R wave progression.  Normal intervals.  No ectopy.  No LVH.  No ST segment elevations or depressions.  No significant change compared to EKG dated 6/19/2019.  Impression sinus rhythm with no evidence of acute ischemia or arrhythmia.    Critical Care time:  none               No results found for this or any previous visit (from the past 24 hour(s)).    Medications - No data to display    Assessments & Plan (with Medical Decision Making)   27 year old female with past medical history of anxiety, hyperlipidemia, testicle some disorder with multiple complaints including left-sided neck and shoulder pain, left elbow pain, and chest pain.    Regarding her chest pain, only lasting 1 to 2 seconds which makes it very unlikely to be cardiac in origin.  EKG with no evidence of acute ischemia or arrhythmia.  Has been intermittent and not exertional or associated with dyspnea.  Do not think that further cardiac work-up necessary.  No pleuritic component.  PERC negative.  Very low suspicion for VTE.  No fever or cough to suggest pneumonia.  Nontoxic appearance and not associated with emesis making esophageal catastrophe unlikely.  She did however have 1 instance of pain substernally which occurred when swallowing and lasted approximately 20 seconds.  This could be esophageal spasm but has not had any symptoms since that time.  Diagnostics in the ED unlikely to be fruitful.    Regarding neck and shoulder pain, no midline tenderness or reported trauma.  Mild discomfort musculature left side of neck and into left trapezius.  Not worsened with movement or palpation.  Likely a muscular strain.  Given lack of pain with range of motion or  palpation to cervical spinal feel imaging will be necessary.  No pain in left shoulder on exam.    Pain left elbow unclear.  No pain now nor with direct palpation or passive range of motion detailed above.  This is her nondominant hand.    She was also having gas and discomfort particular after eating.  This could be gastritis/peptic ulcer disease.  Also has inconsistency recently with bowel movements.  Recommend that she tries MiraLAX or Metamucil and help get her bowels regular first and then consider possible treatment and acid.  She said that she had spoken to her primary provider who already recommended MiraLAX to her.  I think it is okay for her to follow-up on these issues in clinic.    Some of her symptoms she experienced likely worsened by anxiety.  She had reassuring work-up today and hopefully this is helpful.  If her symptoms continue I would like her to follow-up with her primary care provider.    I have reviewed the nursing notes.         Discharge Medication List as of 5/9/2023  5:53 PM          Final diagnoses:   Strain of neck muscle, initial encounter   Chest pain, unspecified type     Krishna Doan MD    5/9/2023   [facility] EMERGENCY DEPT    Disclaimer: This note consists of words and symbols derived from keyboarding and dictation using voice recognition software.  As a result, there may be errors that have gone undetected.  Please consider this when interpreting information found in this note.             Krishna Doan MD  05/17/23 6253

## 2023-05-09 NOTE — DISCHARGE INSTRUCTIONS
Your evaluation in the Emergency Department was reassuring.     As we discussed, try miralax or metamucil to help regulate your bowel function. If you still have gas and discomfort after eating I would follow up with your primary care provider and try pepcid which can be purchased over the counter.     You may take ibuprofen/tylenol for your neck pain. You may also find comfort with heating pads or ice packs.     If your symptoms worsen or you develop new or concerning symptoms, please return to the Emergency Department for further evaluation and treatment.

## 2023-05-09 NOTE — ED NOTES
Sat was having some epigastric pain, took some gas-x and felt like it helped. Has had some back and chest pain off and on since then and has palpitations, left arm pain and pain into neck and jaw.

## 2023-05-09 NOTE — ED TRIAGE NOTES
"\" I think I am having a panic attack\"   The last 3 days pain in my back, chest, anxious, palpitations.  Pt tearful in triage     Triage Assessment     Row Name 05/09/23 1242       Triage Assessment (Adult)    Airway WDL WDL       Cardiac WDL    Cardiac WDL X              "

## 2023-05-10 LAB
BKR LAB AP GYN ADEQUACY: NORMAL
BKR LAB AP GYN INTERPRETATION: NORMAL
BKR LAB AP HPV REFLEX: NORMAL
BKR LAB AP PREVIOUS ABNORMAL: NORMAL
PATH REPORT.COMMENTS IMP SPEC: NORMAL
PATH REPORT.COMMENTS IMP SPEC: NORMAL
PATH REPORT.RELEVANT HX SPEC: NORMAL

## 2023-05-26 ENCOUNTER — TRANSFERRED RECORDS (OUTPATIENT)
Dept: HEALTH INFORMATION MANAGEMENT | Facility: CLINIC | Age: 28
End: 2023-05-26

## 2023-06-02 ENCOUNTER — HOSPITAL ENCOUNTER (EMERGENCY)
Facility: CLINIC | Age: 28
Discharge: HOME | End: 2023-06-02
Attending: EMERGENCY MEDICINE | Admitting: EMERGENCY MEDICINE
Payer: COMMERCIAL

## 2023-06-02 ENCOUNTER — APPOINTMENT (OUTPATIENT)
Dept: GENERAL RADIOLOGY | Facility: CLINIC | Age: 28
End: 2023-06-02
Attending: EMERGENCY MEDICINE
Payer: COMMERCIAL

## 2023-06-02 VITALS
HEIGHT: 63 IN | BODY MASS INDEX: 24.27 KG/M2 | WEIGHT: 137 LBS | RESPIRATION RATE: 18 BRPM | HEART RATE: 86 BPM | SYSTOLIC BLOOD PRESSURE: 115 MMHG | TEMPERATURE: 97.5 F | OXYGEN SATURATION: 98 % | DIASTOLIC BLOOD PRESSURE: 76 MMHG

## 2023-06-02 DIAGNOSIS — R91.8 PULMONARY NODULES: ICD-10-CM

## 2023-06-02 DIAGNOSIS — R00.2 PALPITATIONS: ICD-10-CM

## 2023-06-02 LAB
ANION GAP SERPL CALCULATED.3IONS-SCNC: 13 MMOL/L (ref 7–15)
BASOPHILS # BLD AUTO: 0 10E3/UL (ref 0–0.2)
BASOPHILS NFR BLD AUTO: 0 %
BUN SERPL-MCNC: 10.7 MG/DL (ref 6–20)
CALCIUM SERPL-MCNC: 9.8 MG/DL (ref 8.6–10)
CHLORIDE SERPL-SCNC: 100 MMOL/L (ref 98–107)
CREAT SERPL-MCNC: 0.53 MG/DL (ref 0.51–0.95)
DEPRECATED HCO3 PLAS-SCNC: 24 MMOL/L (ref 22–29)
EOSINOPHIL # BLD AUTO: 0 10E3/UL (ref 0–0.7)
EOSINOPHIL NFR BLD AUTO: 0 %
ERYTHROCYTE [DISTWIDTH] IN BLOOD BY AUTOMATED COUNT: 12.2 % (ref 10–15)
GFR SERPL CREATININE-BSD FRML MDRD: >90 ML/MIN/1.73M2
GLUCOSE SERPL-MCNC: 94 MG/DL (ref 70–99)
HCG SERPL QL: NEGATIVE
HCT VFR BLD AUTO: 42 % (ref 35–47)
HGB BLD-MCNC: 14.4 G/DL (ref 11.7–15.7)
HOLD SPECIMEN: NORMAL
HOLD SPECIMEN: NORMAL
IMM GRANULOCYTES # BLD: 0.1 10E3/UL
IMM GRANULOCYTES NFR BLD: 0 %
LYMPHOCYTES # BLD AUTO: 1.4 10E3/UL (ref 0.8–5.3)
LYMPHOCYTES NFR BLD AUTO: 11 %
MCH RBC QN AUTO: 31.4 PG (ref 26.5–33)
MCHC RBC AUTO-ENTMCNC: 34.3 G/DL (ref 31.5–36.5)
MCV RBC AUTO: 92 FL (ref 78–100)
MONOCYTES # BLD AUTO: 0.7 10E3/UL (ref 0–1.3)
MONOCYTES NFR BLD AUTO: 5 %
NEUTROPHILS # BLD AUTO: 11.1 10E3/UL (ref 1.6–8.3)
NEUTROPHILS NFR BLD AUTO: 84 %
NRBC # BLD AUTO: 0 10E3/UL
NRBC BLD AUTO-RTO: 0 /100
PLATELET # BLD AUTO: 216 10E3/UL (ref 150–450)
POTASSIUM SERPL-SCNC: 4.2 MMOL/L (ref 3.4–5.3)
RBC # BLD AUTO: 4.59 10E6/UL (ref 3.8–5.2)
SODIUM SERPL-SCNC: 137 MMOL/L (ref 136–145)
TSH SERPL DL<=0.005 MIU/L-ACNC: 0.68 UIU/ML (ref 0.3–4.2)
WBC # BLD AUTO: 13.3 10E3/UL (ref 4–11)

## 2023-06-02 PROCEDURE — 84703 CHORIONIC GONADOTROPIN ASSAY: CPT | Performed by: EMERGENCY MEDICINE

## 2023-06-02 PROCEDURE — 80048 BASIC METABOLIC PNL TOTAL CA: CPT | Performed by: EMERGENCY MEDICINE

## 2023-06-02 PROCEDURE — 71046 X-RAY EXAM CHEST 2 VIEWS: CPT

## 2023-06-02 PROCEDURE — 99284 EMERGENCY DEPT VISIT MOD MDM: CPT | Mod: 25 | Performed by: EMERGENCY MEDICINE

## 2023-06-02 PROCEDURE — 36415 COLL VENOUS BLD VENIPUNCTURE: CPT | Performed by: EMERGENCY MEDICINE

## 2023-06-02 PROCEDURE — 93010 ELECTROCARDIOGRAM REPORT: CPT | Performed by: EMERGENCY MEDICINE

## 2023-06-02 PROCEDURE — 85025 COMPLETE CBC W/AUTO DIFF WBC: CPT | Performed by: EMERGENCY MEDICINE

## 2023-06-02 PROCEDURE — 93005 ELECTROCARDIOGRAM TRACING: CPT | Performed by: EMERGENCY MEDICINE

## 2023-06-02 PROCEDURE — 99285 EMERGENCY DEPT VISIT HI MDM: CPT | Mod: 25 | Performed by: EMERGENCY MEDICINE

## 2023-06-02 PROCEDURE — 84443 ASSAY THYROID STIM HORMONE: CPT | Performed by: EMERGENCY MEDICINE

## 2023-06-02 RX ORDER — SERTRALINE HYDROCHLORIDE 100 MG/1
50 TABLET, FILM COATED ORAL DAILY
COMMUNITY
End: 2024-01-09

## 2023-06-02 ASSESSMENT — ACTIVITIES OF DAILY LIVING (ADL)
ADLS_ACUITY_SCORE: 35
ADLS_ACUITY_SCORE: 33

## 2023-06-02 NOTE — ED TRIAGE NOTES
Here with palpitations, dizziness & having short lived intermittent pains to midsternal chest. States she was just sitting at home & fitbit alerted her to being in 130s at rest, lasted about a minute but then dizziness & pain started, waxing & waning sice, no hx of arrythmias, HR went from 115 to 85 in triage     Triage Assessment     Row Name 06/02/23 5703       Triage Assessment (Adult)    Airway WDL WDL       Respiratory WDL    Respiratory WDL WDL       Skin Circulation/Temperature WDL    Skin Circulation/Temperature WDL WDL       Cardiac WDL    Cardiac WDL X;chest pain       Chest Pain Assessment    Chest Pain Location midsternal    Character sharp    Duration intermittent    Precipitating Factors unknown    Alleviating Factors nothing    Associated Signs/Symptoms anxiety;dizziness       Peripheral/Neurovascular WDL    Peripheral Neurovascular WDL WDL       Cognitive/Neuro/Behavioral WDL    Cognitive/Neuro/Behavioral WDL WDL       [place] Coma Scale    Best Eye Response 4-->(E4) spontaneous    Best Motor Response 6-->(M6) obeys commands    Best Verbal Response 5-->(V5) oriented    Ally Coma Scale Score 15

## 2023-06-03 NOTE — ED PROVIDER NOTES
History     Chief Complaint   Patient presents with     Palpitations     Chest pain       Chest Pain     HPI  Anali Alvarez is a 27 year old female with history notable for anxiety, obsessive compulsive disorder, hyperlipidemia, with palpitations. Has a fit bit which alerted her that her heart rate was elevated to the 130's. This occurred while resting. Felt hot and cold, shaky, lightheaded, and dizzy. Started sertraline 3 days ago for anxiety and depression. Has been on this before and discontinued on her own about 6 months ago.  Did not have trouble on sertraline previously.     Did not report chest pain to me but on ED triage note short-lived intermittent chest pain to the mid sternum.    The patient's PMHx, Surgical Hx, Allergies, and Medications were all reviewed with the patient.    Allergies:  Allergies   Allergen Reactions     Zithromax [Azithromycin Dihydrate] Rash       Problem List:    Patient Active Problem List    Diagnosis Date Noted     Encounter for induction of labor 04/14/2021     Priority: Medium     Anxiety 02/10/2021     Priority: Medium     OCD (obsessive compulsive disorder) 02/10/2021     Priority: Medium     Mixed hyperlipidemia 09/15/2016     Priority: Medium     Migraine with aura, not intractable, without status migrainosus 09/13/2016     Priority: Medium     exedrine prn          Past Medical History:    Past Medical History:   Diagnosis Date     Depressive disorder        Past Surgical History:    Past Surgical History:   Procedure Laterality Date     wisdom teeth         Family History:    Family History   Problem Relation Age of Onset     Thyroid Disease Mother         with pregnacy     Anxiety Disorder Sister      Brain Cancer Maternal Grandfather         ??     Other Cancer Maternal Grandfather      Coronary Artery Disease Paternal Grandfather      Diabetes Type 1 Paternal Aunt      Diabetes Other         Aunt     Other Cancer Other         Uncle     Anxiety Disorder Sister   "      Social History:  Marital Status:   [2]  Social History     Tobacco Use     Smoking status: Never     Smokeless tobacco: Never   Vaping Use     Vaping status: Never Used   Substance Use Topics     Alcohol use: Yes     Drug use: Yes     Types: Marijuana        Medications:    acetaminophen (TYLENOL) 325 MG tablet  ibuprofen (ADVIL/MOTRIN) 800 MG tablet  polyethylene glycol (MIRALAX) 17 GM/Dose powder  senna-docusate (SENOKOT-S/PERICOLACE) 8.6-50 MG tablet  sertraline (ZOLOFT) 100 MG tablet          Review of Systems  Pertinent positives and negatives mentioned in HPI    Physical Exam   BP: 132/81  Pulse: 115  Temp: 97.5  F (36.4  C)  Resp: 18  Height: 160 cm (5' 3\")  Weight: 62.1 kg (137 lb)  SpO2: 100 %    Physical Exam  GEN: Flat affect poor eye contact  HENT: MMM. External ears and nose normal bilaterally.  EYES: EOM intact. Conjunctiva clear. No discharge.   NECK: Symmetric, freely mobile.   CV : Regular rate and rhythm.  No murmurs appreciated  PULM: Normal effort. No wheezes, rales, or rhonchi bilaterally.  ABD: Soft, non-tender, non-distended. No rebound or guarding.   NEURO: Normal muscle tone, no hyperreflexia and no inducible clonus  EXT: No gross deformity. Warm and well perfused.  INT: Warm. No diaphoresis. Normal color.        ED Course        Procedures         EKG: Interpreted by myself.  Sinus rhythm with rate of 88 bpm.  Normal axis.  Normal R wave progression.  No ST segment elevations or depressions.  Normal intervals.  No changes compared to 5/9/2023.  Impression normal EKG with no acute changes    Critical Care time:  none               Results for orders placed or performed during the hospital encounter of 06/02/23 (from the past 24 hour(s))   Basic metabolic panel   Result Value Ref Range    Sodium 137 136 - 145 mmol/L    Potassium 4.2 3.4 - 5.3 mmol/L    Chloride 100 98 - 107 mmol/L    Carbon Dioxide (CO2) 24 22 - 29 mmol/L    Anion Gap 13 7 - 15 mmol/L    Urea Nitrogen 10.7 6.0 - " 20.0 mg/dL    Creatinine 0.53 0.51 - 0.95 mg/dL    Calcium 9.8 8.6 - 10.0 mg/dL    Glucose 94 70 - 99 mg/dL    GFR Estimate >90 >60 mL/min/1.73m2   CBC with platelets differential    Narrative    The following orders were created for panel order CBC with platelets differential.  Procedure                               Abnormality         Status                     ---------                               -----------         ------                     CBC with platelets and d...[[id number]]  Abnormal            Final result                 Please view results for these tests on the individual orders.   TSH with free T4 reflex   Result Value Ref Range    TSH 0.68 0.30 - 4.20 uIU/mL   [facility] Draw    Narrative    The following orders were created for panel order [facility].  Procedure                               Abnormality         Status                     ---------                               -----------         ------                     Extra Blue Top Tube[[id number]]                              Final result               Extra Red Top Tube[[id number]]                               Final result                 Please view results for these tests on the individual orders.   CBC with platelets and differential   Result Value Ref Range    WBC Count 13.3 (H) 4.0 - 11.0 10e3/uL    RBC Count 4.59 3.80 - 5.20 10e6/uL    Hemoglobin 14.4 11.7 - 15.7 g/dL    Hematocrit 42.0 35.0 - 47.0 %    MCV 92 78 - 100 fL    MCH 31.4 26.5 - 33.0 pg    MCHC 34.3 31.5 - 36.5 g/dL    RDW 12.2 10.0 - 15.0 %    Platelet Count 216 150 - 450 10e3/uL    % Neutrophils 84 %    % Lymphocytes 11 %    % Monocytes 5 %    % Eosinophils 0 %    % Basophils 0 %    % Immature Granulocytes 0 %    NRBCs per 100 WBC 0 <1 /100    Absolute Neutrophils 11.1 (H) 1.6 - 8.3 10e3/uL    Absolute Lymphocytes 1.4 0.8 - 5.3 10e3/uL    Absolute Monocytes 0.7 0.0 - 1.3 10e3/uL    Absolute Eosinophils 0.0 0.0 - 0.7 10e3/uL    Absolute Basophils 0.0 0.0 - 0.2 10e3/uL     Absolute Immature Granulocytes 0.1 <=0.4 10e3/uL    Absolute NRBCs 0.0 10e3/uL   Extra Blue Top Tube   Result Value Ref Range    Hold Specimen JIC    Extra Red Top Tube   Result Value Ref Range    Hold Specimen JIC    HCG qualitative pregnancy (blood)   Result Value Ref Range    hCG Serum Qualitative Negative Negative   Chest XR,  PA & LAT    Narrative    EXAM: XR CHEST 2 VIEWS  LOCATION: [facility]  DATE/TIME: 6/2/2023 8:20 PM CDT    INDICATION: Intermittent chest pain and palpitations.  COMPARISON: 11/12/2013.      Impression    IMPRESSION: Heart size and pulmonary vascularity within normal limits. No focal pulmonary infiltrates. 3 mm nodular opacity projected over the posterolateral left sixth rib new since previous examination. Given its density, this is likely calcified,   however, if concern remains, could consider routine outpatient CT chest. Visualized bones unremarkable.       Medications - No data to display    Assessments & Plan (with Medical Decision Making)   27 year old female with past medical history of anxiety, depression, obsessive compulsive disorder, hyperlipidemia, with palpitations and heart rate elevated to 130s on Fitbit.    EKG without any evidence of acute ischemia.  Very low suspicion for ACS and do not think further work-up necessary.  CBC with mild leukocytosis however no fever or other obvious source of infection.  BMP normal.  TSH normal.  No abdominal tenderness, nausea or vomiting to suggest acute intra-abdominal infection.  No fever, cough, shortness of breath to suggest pneumonia.  Chest x-ray without acute infiltrate, effusion or pneumothorax.  There was an incidental 3 mm in nodular opacity projected over the posterior lateral sixth left rib which is new from prior.  Did mention this is an old finding and can follow-up with her primary care team and future repeat imaging.    May not feel well restarting selective serotonin reuptake inhibitor, no signs  hypereflexia, muscle rigidity, altered mental status, clonus to suggest serotonin syndrome. Tachycardia resolved w/out direct intervention.     Multiple ED visits for palpitations. ZIO monitor ordered.     PCP follow-up as needed. ED return precautions discussed.         I have reviewed the nursing notes.         Discharge Medication List as of 6/2/2023  9:40 PM          Final diagnoses:   Palpitations   Pulmonary nodules     Krishna Doan MD    6/2/2023   [facility] EMERGENCY DEPT    Disclaimer: This note consists of words and symbols derived from keyboarding and dictation using voice recognition software.  As a result, there may be errors that have gone undetected.  Please consider this when interpreting information found in this note.             Krishna Doan MD  06/06/23 1712

## 2023-06-03 NOTE — DISCHARGE INSTRUCTIONS
Your evaluation in the Emergency Department was reassuring.     There was an incidental nodule on your chest Xray. This is unlikely to be related to your symptoms today. This should be follow up on with your primary care team.     An order for an outpatient heart monitor has been placed and you will need to call [phone] to schedule an appointment to have monitor applied.     If your symptoms worsen or you develop new or concerning symptoms, please return to the Emergency Department for further evaluation and treatment.

## 2023-06-05 ENCOUNTER — PATIENT OUTREACH (OUTPATIENT)
Dept: CARE COORDINATION | Facility: CLINIC | Age: 28
End: 2023-06-05
Payer: COMMERCIAL

## 2023-06-05 NOTE — LETTER
[facility] CARE COORDINATION  [address] [address]    June 6, 2023    Anali Alvarez  [address] [address] [address]      Dear Chrystina, Melissa Behl is a clinical product navigator that works on behalf of [facility]. We wanted to introduce our role to you, as we can help you establish care with recommended providers for ongoing care within our health care system.     This role serves as a liaison between [facility]'s clinical network and the health insurance plans to provide guidance on establishing primary and specialty care needs. One of the benefits of having a primary care provider from the network is that your care team will help coordinate your care and guide you through any additional care you may need. Our care team is focused and trained to treat the whole person and can help you with all your physical, emotional, and social concerns.     Also, our Primary Care Clinics are all supported by Clinic Care Coordination:     The clinic care coordination team is made up of a registered nurse, , financial resource worker and community health worker who understand the health care system. The goal of clinic care coordination is to help you manage your health and improve access to the health care system. Our team works alongside your provider to assist you in determining your health and social needs. We can help you obtain health care and community resources, providing you with necessary information and education. We can work with you through any barriers and develop a care plan that helps coordinate and strengthen the communication between you and your care team.  Our services are voluntary and are offered without charge to you personally.    Please feel free to contact Melissa Behl (phone [phone]) with any questions or concerns regarding care coordination and what we can offer.      We are focused on providing you with the  highest-quality healthcare experience possible.    Sincerely,     [facility] Clinical Product Navigation Team

## 2023-06-05 NOTE — PROGRESS NOTES
Clinical Product Navigator RN reviewed chart; patient on payer product coverage.  Review results:   CPN Initial Information Gathering  Referral Source: IP Report    Patient identified by IP Epic report as high utilization.     Referral Source: IP Report  Clinical Data: Care Coordinator Outreach  Outreach attempted x 1.  Left message on patient's voicemail with call back information and requested return call.  Plan: CPN RN Care Coordinator will try to reach patient again in 1-2 business days.    Lisa Ching RN CC  Casual Clinical Product Navigator Coverage

## 2023-06-06 NOTE — PROGRESS NOTES
Clinic Care Coordination Contact  [facility]/Voicemail    Referral Source: IP Report  Clinical Data: Care Coordinator Outreach  Outreach attempted x 2.  Left message on patient's voicemail with call back information and requested return call.  Plan: Care Coordinator will send care coordination introduction letter with care coordinator contact information and explanation of care coordination services via [employer]. Care Coordinator will do no further outreaches at this time.    Lisa Ching RN CC  Casual Clinical Product Navigator Coverage

## 2023-06-07 ENCOUNTER — HOSPITAL ENCOUNTER (OUTPATIENT)
Dept: CARDIOLOGY | Facility: CLINIC | Age: 28
Discharge: HOME | End: 2023-06-07
Attending: EMERGENCY MEDICINE | Admitting: EMERGENCY MEDICINE
Payer: COMMERCIAL

## 2023-06-07 DIAGNOSIS — R00.2 PALPITATIONS: ICD-10-CM

## 2023-06-07 PROCEDURE — 93244 EXT ECG>48HR<7D REV&INTERPJ: CPT | Performed by: EMERGENCY MEDICINE

## 2023-06-07 PROCEDURE — 93242 EXT ECG>48HR<7D RECORDING: CPT

## 2023-06-29 ENCOUNTER — ANCILLARY PROCEDURE (OUTPATIENT)
Dept: GENERAL RADIOLOGY | Facility: CLINIC | Age: 28
End: 2023-06-29
Attending: STUDENT IN AN ORGANIZED HEALTH CARE EDUCATION/TRAINING PROGRAM
Payer: COMMERCIAL

## 2023-06-29 ENCOUNTER — OFFICE VISIT (OUTPATIENT)
Dept: FAMILY MEDICINE | Facility: CLINIC | Age: 28
End: 2023-06-29
Payer: COMMERCIAL

## 2023-06-29 VITALS
SYSTOLIC BLOOD PRESSURE: 118 MMHG | RESPIRATION RATE: 14 BRPM | OXYGEN SATURATION: 99 % | BODY MASS INDEX: 24.45 KG/M2 | HEIGHT: 63 IN | TEMPERATURE: 97.8 F | WEIGHT: 138 LBS | DIASTOLIC BLOOD PRESSURE: 78 MMHG | HEART RATE: 87 BPM

## 2023-06-29 DIAGNOSIS — R00.2 PALPITATIONS: ICD-10-CM

## 2023-06-29 DIAGNOSIS — R91.8 PULMONARY NODULES: ICD-10-CM

## 2023-06-29 DIAGNOSIS — Z09 HOSPITAL DISCHARGE FOLLOW-UP: Primary | ICD-10-CM

## 2023-06-29 PROCEDURE — 99214 OFFICE O/P EST MOD 30 MIN: CPT | Performed by: STUDENT IN AN ORGANIZED HEALTH CARE EDUCATION/TRAINING PROGRAM

## 2023-06-29 PROCEDURE — 71046 X-RAY EXAM CHEST 2 VIEWS: CPT | Mod: TC | Performed by: RADIOLOGY

## 2023-06-29 ASSESSMENT — PAIN SCALES - GENERAL: PAINLEVEL: NO PAIN (0)

## 2023-06-29 NOTE — PROGRESS NOTES
1. Hospital discharge follow-up  2. Palpitations  > at this time it appears that the patient's symptoms have improved, will continue to monitor    - although patient had tolerated sertraline in the past, she admits that since stopping the medication her symptoms have improved   - given her blood pressure and HR are within normal limits, will hold off on beta blockers   - encouraged patient to schedule follow up appointment with psychiatrist to discuss alternative treatment options for mental health given possible tachycardia due to SSRIs treatment   - patient aware to reach out to me in 1 week if she has not heard back regarding her monitor results since it is unclear if the results will be available to me since I was not the ordering provider     3. Pulmonary nodules  > chest X-Ray from ED visit showed the patient had an incidental finding of lung nodule   - XR Chest 2 Views; Future      Subjective   Estela is a 27 year old, presenting for the following health issues:  ER F/U and Results (On take home heart monitor )        6/29/2023     9:37 AM   Additional Questions   Roomed by Ivet FONTANA LPN   Accompanied by self         6/29/2023     9:37 AM   Patient Reported Additional Medications   Patient reports taking the following new medications no new meds     HPI     ED/UC Followup:    Facility:  [facility] ED  Date of visit: 06/02/2023  Reason for visit: chest pain; palpitations  Current Status: has felt her heart beat in her throat a couple times since being in the ED. Has felt some fluttering in her chest and some pains but is unsure if it is related to her heart or not. Would like to get the results of her take home heart monitor she mailed in a few weeks ago. Had some imaging in the ED and would like clarification on that result as well. Was advised by her psychiatrist to stop taking her sertraline until further notice to make sure the heart palpitations were not related to the new  "medication.    Currently denies any chest pain or palpitations   States her symptoms have improved since leaving the ER   Reports she mailed out her monitor two weeks ago, results are pending at this time          Review of Systems   As above       Objective    /78 (BP Location: Right arm, Patient Position: Sitting, Cuff Size: Adult Regular)   Pulse 87   Temp 97.8  F (36.6  C) (Tympanic)   Resp 14   Ht 1.6 m (5' 3\")   Wt 62.6 kg (138 lb)   LMP 06/18/2023 (Approximate)   SpO2 99%   BMI 24.45 kg/m    Body mass index is 24.45 kg/m .  Physical Exam  Constitutional:       General: She is not in acute distress.  HENT:      Head: Normocephalic and atraumatic.      Right Ear: External ear normal.      Left Ear: External ear normal.      Mouth/Throat:      Mouth: Mucous membranes are moist.      Pharynx: Oropharynx is clear. No oropharyngeal exudate or posterior oropharyngeal erythema.   Eyes:      Extraocular Movements: Extraocular movements intact.   Cardiovascular:      Rate and Rhythm: Normal rate and regular rhythm.      Heart sounds: Normal heart sounds.   Pulmonary:      Effort: Pulmonary effort is normal. No respiratory distress.      Breath sounds: Normal breath sounds. No wheezing or rhonchi.   Abdominal:      Palpations: Abdomen is soft. There is no mass.      Tenderness: There is no abdominal tenderness.   Musculoskeletal:         General: No deformity. Normal range of motion.      Cervical back: Normal range of motion and neck supple.   Skin:     General: Skin is warm.      Findings: No rash.   Neurological:      General: No focal deficit present.      Mental Status: She is alert and oriented to person, place, and time.   Psychiatric:         Mood and Affect: Mood normal.                      "

## 2023-07-06 ENCOUNTER — MYC MEDICAL ADVICE (OUTPATIENT)
Dept: FAMILY MEDICINE | Facility: CLINIC | Age: 28
End: 2023-07-06
Payer: COMMERCIAL

## 2023-07-06 NOTE — TELEPHONE ENCOUNTER
Called [facility]. Final report is ready, just needs cardiologist signature and upload to Epic. [employer] will have it printed for cardiologist to sign tomorrow and uploaded. Pt notified via [facility]. Georgie Luque RN Cardiology July 6, 2023, 3:43 PM

## 2023-07-06 NOTE — TELEPHONE ENCOUNTER
"Forwarding MyChart to PCP and cardiology for follow-up please.   It appears the result is still \"In process\", but perhaps there is something I'm not seeing or accessing.    Louise Orozco RN  [facility]    "

## 2023-07-12 NOTE — TELEPHONE ENCOUNTER
Report received and faxed to [facility] att: Dr. Jay Alexander at [phone]    Hillary Gonsalez RN

## 2024-01-09 ENCOUNTER — OFFICE VISIT (OUTPATIENT)
Dept: FAMILY MEDICINE | Facility: CLINIC | Age: 29
End: 2024-01-09
Payer: COMMERCIAL

## 2024-01-09 VITALS
HEIGHT: 65 IN | RESPIRATION RATE: 18 BRPM | BODY MASS INDEX: 19.74 KG/M2 | SYSTOLIC BLOOD PRESSURE: 118 MMHG | TEMPERATURE: 98 F | HEART RATE: 82 BPM | DIASTOLIC BLOOD PRESSURE: 72 MMHG | WEIGHT: 118.5 LBS | OXYGEN SATURATION: 99 %

## 2024-01-09 DIAGNOSIS — Z23 IMMUNIZATION DUE: ICD-10-CM

## 2024-01-09 DIAGNOSIS — N64.4 MASTALGIA: ICD-10-CM

## 2024-01-09 DIAGNOSIS — Z11.59 NEED FOR HEPATITIS C SCREENING TEST: Primary | ICD-10-CM

## 2024-01-09 PROCEDURE — 90471 IMMUNIZATION ADMIN: CPT | Performed by: STUDENT IN AN ORGANIZED HEALTH CARE EDUCATION/TRAINING PROGRAM

## 2024-01-09 PROCEDURE — 90480 ADMN SARSCOV2 VAC 1/ONLY CMP: CPT | Performed by: STUDENT IN AN ORGANIZED HEALTH CARE EDUCATION/TRAINING PROGRAM

## 2024-01-09 PROCEDURE — 99213 OFFICE O/P EST LOW 20 MIN: CPT | Mod: 25 | Performed by: STUDENT IN AN ORGANIZED HEALTH CARE EDUCATION/TRAINING PROGRAM

## 2024-01-09 PROCEDURE — 91320 SARSCV2 VAC 30MCG TRS-SUC IM: CPT | Performed by: STUDENT IN AN ORGANIZED HEALTH CARE EDUCATION/TRAINING PROGRAM

## 2024-01-09 PROCEDURE — 90686 IIV4 VACC NO PRSV 0.5 ML IM: CPT | Performed by: STUDENT IN AN ORGANIZED HEALTH CARE EDUCATION/TRAINING PROGRAM

## 2024-01-09 ASSESSMENT — PAIN SCALES - GENERAL: PAINLEVEL: NO PAIN (0)

## 2024-01-09 NOTE — PROGRESS NOTES
1. Mastalgia  > suspect in the setting of ill fitting bras, patient states she has lost some weight (intentional) and bought some new bras around the time her symptoms began  - no personal or family history of cancer   - US Breast Bilateral Limited 1-3 Quadrants; Future  - patient aware she can try getting a set of bras that fit a little better around the band     3. Immunization due  > administered the following today:   - INFLUENZA VACCINE IM > 6 MONTHS VALENT IIV4 (AFLURIA/FLUZONE)  - COVID-19 12+ (2023-24) (PFIZER)    Follow-up Plan:   - if breast pain doesn't improve with changing bras, and if her ultrasound shows no abnormal findings, can consider physical therapy referral for suspected MSK pain     Asaf Palmer is a 28 year old, presenting for the following health issues:  Breast Pain        1/9/2024     7:48 AM   Additional Questions   Roomed by Ivet FONTANA LPN   Accompanied by self         1/9/2024     7:48 AM   Patient Reported Additional Medications   Patient reports taking the following new medications Clomipramine? For anxiety/OCD - 100mg daily       History of Present Illness       Reason for visit:  Pain on sides of chest/under armpits  Symptom onset:  More than a month    She eats 2-3 servings of fruits and vegetables daily.She consumes 0 sweetened beverage(s) daily.She exercises with enough effort to increase her heart rate 30 to 60 minutes per day.  She exercises with enough effort to increase her heart rate 3 or less days per week.   She is taking medications regularly.     Breast Concern  Onset/Duration: started about 2 months ago on the right side, last 2 weeks it has been both sides  Description:   Location: outside of breast area under axillaries   Pain or tenderness: yes, felt like a burning rash irritation and that turned into a direct point of pain. Like a pinching twisting feeling. It will happen when she reaches for things. When she lifts and picks up her child or stretches the  areas it becomes painful. Pushing on it does not make it painful but the area will remain tender for awhile after having the pinching feeling.   Redness: No  Intensity: moderate when the pain happens it gets up to 7-8/10  Progression of Symptoms: worsening and same  Accompanying Signs & Symptoms:  Any lumps in axillary region: No  Movable: N/A  Nipple discharge: None  Changes in the skin or nipple: None  On Hormone therapy: No  Does it change with menstrual cycle: No  Previous history of similar problem:   First degree relative with breast cancer: a negative family history for breast cancer. and negative history of colon and ovarian cancers  Precipitating factors:           Worsened by: lifting, stretching, reaching  Alleviating factors:            Improved by: just stopping what she is doing and wait for it to dissipate   Therapies tried and outcome: None  Patient's last menstrual period was 12/24/2023 (exact date).    Not related to her menstrual cycle   No rashes seen by patient   Denies any nipple discharge   Reports breasts are symmetrical on both sides   Actually just bought a new set of bras around the same time as symptoms onset because of weight loss she had to go down a size       Review of Systems   As above       Objective    LMP 12/24/2023 (Exact Date)   There is no height or weight on file to calculate BMI.  Physical Exam  Constitutional:       General: She is not in acute distress.     Appearance: Normal appearance.   HENT:      Head: Normocephalic and atraumatic.      Right Ear: External ear normal.      Left Ear: External ear normal.   Eyes:      General: No scleral icterus.        Right eye: No discharge.         Left eye: No discharge.      Extraocular Movements: Extraocular movements intact.      Conjunctiva/sclera: Conjunctivae normal.   Pulmonary:      Effort: Pulmonary effort is normal. No respiratory distress.   Chest:      Chest wall: No mass, lacerations, deformity, swelling, tenderness,  crepitus or edema.   Breasts:     Breasts are symmetrical.      Right: No swelling, bleeding, inverted nipple, mass, nipple discharge, skin change or tenderness.      Left: No swelling, bleeding, inverted nipple, mass, nipple discharge, skin change or tenderness.   Musculoskeletal:         General: No deformity. Normal range of motion.      Cervical back: Normal range of motion and neck supple.   Lymphadenopathy:      Upper Body:      Right upper body: No axillary or pectoral adenopathy.      Left upper body: No axillary or pectoral adenopathy.   Skin:     General: Skin is warm.      Comments: No rash on exposed skin   Neurological:      General: No focal deficit present.      Mental Status: She is alert and oriented to person, place, and time.   Psychiatric:         Mood and Affect: Mood normal.         Behavior: Behavior normal.

## 2024-01-09 NOTE — NURSING NOTE
Immunizations Administered       Name Date Dose VIS Date Route    COVID-19 12+ (2023-24) (Pfizer) 1/9/24  8:29 AM 0.3 mL EUI,10/19/2023,Given today Intramuscular    INFLUENZA VACCINE >6 MONTHS, QUAD,PF 1/9/24  8:29 AM 0.5 mL 08/06/2021, Given Today Intramuscular            Prior to immunization administration, verified patients identity using patient s name and date of birth. Please see Immunization Activity for additional information.     Screening Questionnaire for Adult Immunization    Are you sick today?   No   Do you have allergies to medications, food, a vaccine component or latex?   Yes   Have you ever had a serious reaction after receiving a vaccination?   No   Do you have a long-term health problem with heart, lung, kidney, or metabolic disease (e.g., diabetes), asthma, a blood disorder, no spleen, complement component deficiency, a cochlear implant, or a spinal fluid leak?  Are you on long-term aspirin therapy?   No   Do you have cancer, leukemia, HIV/AIDS, or any other immune system problem?   No   Do you have a parent, brother, or sister with an immune system problem?   No   In the past 3 months, have you taken medications that affect  your immune system, such as prednisone, other steroids, or anticancer drugs; drugs for the treatment of rheumatoid arthritis, Crohn s disease, or psoriasis; or have you had radiation treatments?   No   Have you had a seizure, or a brain or other nervous system problem?   No   During the past year, have you received a transfusion of blood or blood    products, or been given immune (gamma) globulin or antiviral drug?   No   For women: Are you pregnant or is there a chance you could become       pregnant during the next month?   No   Have you received any vaccinations in the past 4 weeks?   No     Immunization questionnaire was positive for at least one answer.  Notified Dr. Kaiser.      Patient instructed to remain in clinic for 15 minutes afterwards, and to report any  adverse reactions.     Screening performed by Ivet FONTANA LPN on 1/9/2024 at 8:33 AM.           .

## 2024-05-21 ENCOUNTER — OFFICE VISIT (OUTPATIENT)
Dept: FAMILY MEDICINE | Facility: CLINIC | Age: 29
End: 2024-05-21
Payer: COMMERCIAL

## 2024-05-21 VITALS
SYSTOLIC BLOOD PRESSURE: 114 MMHG | BODY MASS INDEX: 20.35 KG/M2 | HEIGHT: 64 IN | DIASTOLIC BLOOD PRESSURE: 76 MMHG | WEIGHT: 119.2 LBS | TEMPERATURE: 98.2 F | OXYGEN SATURATION: 100 % | HEART RATE: 103 BPM | RESPIRATION RATE: 18 BRPM

## 2024-05-21 DIAGNOSIS — Z11.59 NEED FOR HEPATITIS C SCREENING TEST: ICD-10-CM

## 2024-05-21 DIAGNOSIS — E78.2 MIXED HYPERLIPIDEMIA: ICD-10-CM

## 2024-05-21 DIAGNOSIS — N92.6 MISSED MENSES: ICD-10-CM

## 2024-05-21 DIAGNOSIS — Z00.00 ROUTINE GENERAL MEDICAL EXAMINATION AT A HEALTH CARE FACILITY: Primary | ICD-10-CM

## 2024-05-21 LAB
CHOLEST SERPL-MCNC: 214 MG/DL
FASTING STATUS PATIENT QL REPORTED: YES
HCG UR QL: NEGATIVE
HCV AB SERPL QL IA: NONREACTIVE
HDLC SERPL-MCNC: 93 MG/DL
LDLC SERPL CALC-MCNC: 112 MG/DL
NONHDLC SERPL-MCNC: 121 MG/DL
TRIGL SERPL-MCNC: 46 MG/DL

## 2024-05-21 PROCEDURE — 99213 OFFICE O/P EST LOW 20 MIN: CPT | Mod: 25 | Performed by: STUDENT IN AN ORGANIZED HEALTH CARE EDUCATION/TRAINING PROGRAM

## 2024-05-21 PROCEDURE — 86803 HEPATITIS C AB TEST: CPT | Performed by: STUDENT IN AN ORGANIZED HEALTH CARE EDUCATION/TRAINING PROGRAM

## 2024-05-21 PROCEDURE — 80061 LIPID PANEL: CPT | Performed by: STUDENT IN AN ORGANIZED HEALTH CARE EDUCATION/TRAINING PROGRAM

## 2024-05-21 PROCEDURE — 81025 URINE PREGNANCY TEST: CPT | Performed by: STUDENT IN AN ORGANIZED HEALTH CARE EDUCATION/TRAINING PROGRAM

## 2024-05-21 PROCEDURE — 36415 COLL VENOUS BLD VENIPUNCTURE: CPT | Performed by: STUDENT IN AN ORGANIZED HEALTH CARE EDUCATION/TRAINING PROGRAM

## 2024-05-21 PROCEDURE — 99395 PREV VISIT EST AGE 18-39: CPT | Performed by: STUDENT IN AN ORGANIZED HEALTH CARE EDUCATION/TRAINING PROGRAM

## 2024-05-21 RX ORDER — CLOMIPRAMINE HYDROCHLORIDE 50 MG/1
100 CAPSULE ORAL AT BEDTIME
COMMUNITY
Start: 2023-11-28

## 2024-05-21 SDOH — HEALTH STABILITY: PHYSICAL HEALTH: ON AVERAGE, HOW MANY DAYS PER WEEK DO YOU ENGAGE IN MODERATE TO STRENUOUS EXERCISE (LIKE A BRISK WALK)?: 3 DAYS

## 2024-05-21 ASSESSMENT — PAIN SCALES - GENERAL: PAINLEVEL: NO PAIN (0)

## 2024-05-21 ASSESSMENT — SOCIAL DETERMINANTS OF HEALTH (SDOH): HOW OFTEN DO YOU GET TOGETHER WITH FRIENDS OR RELATIVES?: TWICE A WEEK

## 2024-05-21 NOTE — RESULT ENCOUNTER NOTE
Bassam Alvarez,     It is a pleasure providing you with medical care. I have received and reviewed your results, and have the following recommendations:     Based on the results of your lipid panel, you do have elevated total cholesterol and elevated LDL (lousy) cholesterol.  At this time no medication changes are needed.  However, to protect your heart health it is important that you incorporate the following lifestyle changes: Try to limit your dietary intake of fatty, greasy, oily, fried, take out, and fast food when possible. Also, I would suggest you cut back on red and processed meats, sodium and sugar-sweetened foods and beverages. Regarding exercise, please get at least 30 minutes of CONTINUOUS moderate intensity aerobic exercise at least 3 times per week.    Your urine pregnancy test was negative.  You are not suspected to be pregnant at this time.    Sincerely,     Marielos Kaiser MD

## 2024-05-21 NOTE — PATIENT INSTRUCTIONS
"Preventive Care Advice   This is general advice we often give to help people stay healthy. Your care team may have specific advice just for you. Please talk to your care team about your own preventive care needs.  Lifestyle  Exercise at least 150 minutes each week (30 minutes a day, 5 days a week).  Do muscle strengthening activities 2 days a week. These help control your weight and prevent disease.  No smoking.  Wear sunscreen to prevent skin cancer.  Have your home tested for radon every 2 to 5 years. Radon is a colorless, odorless gas that can harm your lungs. To learn more, go to www.health.[facility].mn. and search for \"Radon in Homes.\"  Keep guns unloaded and locked up in a safe place like a safe or gun vault, or, use a gun lock and hide the keys. Always lock away bullets separately. To learn more, visit [place]verseEvents.[place].gov and search for \"safe gun storage.\"  Nutrition  Eat 5 or more servings of fruits and vegetables each day.  Try wheat bread, brown rice and whole grain pasta (instead of white bread, rice, and pasta).  Get enough calcium and vitamin D. Check the label on foods and aim for 100% of the RDA (recommended daily allowance).  Regular exams  Have a dental exam and cleaning every 6 months.  See your health care team every year to talk about:  Any changes in your health.  Any medicines your care team has prescribed.  Preventive care, family planning, and ways to prevent chronic diseases.  Shots (vaccines)   HPV shots (up to age 26), if you've never had them before.  Hepatitis B shots (up to age 59), if you've never had them before.  COVID-19 shot: Get this shot when it's due.  Flu shot: Get a flu shot every year.  Tetanus shot: Get a tetanus shot every 10 years.  Pneumococcal, hepatitis A, and RSV shots: Ask your care team if you need these based on your risk.  Shingles shot (for age 50 and up).  General health tests  Diabetes screening:  Starting at age 35, Get screened for diabetes at least every 3 years.  If " you are younger than age 35, ask your care team if you should be screened for diabetes.  Cholesterol test: At age 39, start having a cholesterol test every 5 years, or more often if advised.  Bone density scan (DEXA): At age 50, ask your care team if you should have this scan for osteoporosis (brittle bones).  Hepatitis C: Get tested at least once in your life.  Abdominal aortic aneurysm screening: Talk to your doctor about having this screening if you:  Have ever smoked; and  Are biologically male; and  Are between the ages of 65 and 75.  STIs (sexually transmitted infections)  Before age 24: Ask your care team if you should be screened for STIs.  After age 24: Get screened for STIs if you're at risk. You are at risk for STIs (including HIV) if:  You are sexually active with more than one person.  You don't use condoms every time.  You or a partner was diagnosed with a sexually transmitted infection.  If you are at risk for HIV, ask about PrEP medicine to prevent HIV.  Get tested for HIV at least once in your life, whether you are at risk for HIV or not.  Cancer screening tests  Cervical cancer screening: If you have a cervix, begin getting regular cervical cancer screening tests at age 21. Most people who have regular screenings with normal results can stop after age 65. Talk about this with your provider.  Breast cancer scan (mammogram): If you've ever had breasts, begin having regular mammograms starting at age 40. This is a scan to check for breast cancer.  Colon cancer screening: It is important to start screening for colon cancer at age 45.  Have a colonoscopy test every 10 years (or more often if you're at risk) Or, ask your provider about stool tests like a FIT test every year or Cologuard test every 3 years.  To learn more about your testing options, visit: [email or web address]/852783.pdf.  For help making a decision, visit: [email or web address].  Prostate cancer screening test: If you have a prostate and are age 55  to 69, ask your provider if you would benefit from a yearly prostate cancer screening test.  Lung cancer screening: If you are a current or former smoker age 50 to 80, ask your care team if ongoing lung cancer screenings are right for you.  For informational purposes only. Not to replace the advice of your health care provider. Copyright   2023 [employer]. All rights reserved. Clinically reviewed by the [facility]. [employer] 091352 - REV 04/24.    Learning About Stress  What is stress?     Stress is your body's response to a hard situation. Your body can have a physical, emotional, or mental response. Stress is a fact of life for most people, and it affects everyone differently. What causes stress for you may not be stressful for someone else.  A lot of things can cause stress. You may feel stress when you go on a job interview, take a test, or run a race. This kind of short-term stress is normal and even useful. It can help you if you need to work hard or react quickly. For example, stress can help you finish an important job on time.  Long-term stress is caused by ongoing stressful situations or events. Examples of long-term stress include long-term health problems, ongoing problems at work, or conflicts in your family. Long-term stress can harm your health.  How does stress affect your health?  When you are stressed, your body responds as though you are in danger. It makes hormones that speed up your heart, make you breathe faster, and give you a burst of energy. This is called the fight-or-flight stress response. If the stress is over quickly, your body goes back to normal and no harm is done.  But if stress happens too often or lasts too long, it can have bad effects. Long-term stress can make you more likely to get sick, and it can make symptoms of some diseases worse. If you tense up when you are stressed, you may develop neck, shoulder, or low back pain. Stress is  linked to high blood pressure and heart disease.  Stress also harms your emotional health. It can make you rich, tense, or depressed. Your relationships may suffer, and you may not do well at work or school.  What can you do to manage stress?  You can try these things to help manage stress:   Do something active. Exercise or activity can help reduce stress. Walking is a great way to get started. Even everyday activities such as housecleaning or yard work can help.  Try yoga or edvin chi. These techniques combine exercise and meditation. You may need some training at first to learn them.  Do something you enjoy. For example, listen to music or go to a movie. Practice your hobby or do volunteer work.  Meditate. This can help you relax, because you are not worrying about what happened before or what may happen in the future.  Do guided imagery. Imagine yourself in any setting that helps you feel calm. You can use online videos, books, or a teacher to guide you.  Do breathing exercises. For example:  From a standing position, bend forward from the waist with your knees slightly bent. Let your arms dangle close to the floor.  Breathe in slowly and deeply as you return to a standing position. Roll up slowly and lift your head last.  Hold your breath for just a few seconds in the standing position.  Breathe out slowly and bend forward from the waist.  Let your feelings out. Talk, laugh, cry, and express anger when you need to. Talking with supportive friends or family, a counselor, or a salena leader about your feelings is a healthy way to relieve stress. Avoid discussing your feelings with people who make you feel worse.  Write. It may help to write about things that are bothering you. This helps you find out how much stress you feel and what is causing it. When you know this, you can find better ways to cope.  What can you do to prevent stress?  You might try some of these things to help prevent stress:  Manage your time.  "This helps you find time to do the things you want and need to do.  Get enough sleep. Your body recovers from the stresses of the day while you are sleeping.  Get support. Your family, friends, and community can make a difference in how you experience stress.  Limit your news feed. Avoid or limit time on social media or news that may make you feel stressed.  Do something active. Exercise or activity can help reduce stress. Walking is a great way to get started.  Where can you learn more?  Go to https://www.[employer].net/patiented  Enter N032 in the search box to learn more about \"Learning About Stress.\"  Current as of: October 24, 2023               Content Version: 14.0    4605-3305 [employer].   Care instructions adapted under license by your healthcare professional. If you have questions about a medical condition or this instruction, always ask your healthcare professional. [employer] disclaims any warranty or liability for your use of this information.      Substance Use Disorder: Care Instructions  Overview     You can improve your life and health by stopping your use of alcohol or drugs. When you don't drink or use drugs, you may feel and sleep better. You may get along better with your family, friends, and coworkers. There are medicines and programs that can help with substance use disorder.  How can you care for yourself at home?  Here are some ways to help you stay sober and prevent relapse.  If you have been given medicine to help keep you sober or reduce your cravings, be sure to take it exactly as prescribed.  Talk to your doctor about programs that can help you stop using drugs or drinking alcohol.  Do not keep alcohol or drugs in your home.  Plan ahead. Think about what you'll say if other people ask you to drink or use drugs. Try not to spend time with people who drink or use drugs.  Use the time and money spent on drinking or drugs to do something that's important to " you.  Preventing a relapse  Have a plan to deal with relapse. Learn to recognize changes in your thinking that lead you to drink or use drugs. Get help before you start to drink or use drugs again.  Try to stay away from situations, friends, or places that may lead you to drink or use drugs.  If you feel the need to drink alcohol or use drugs again, seek help right away. Call a trusted friend or family member. Some people get support from organizations such as Narcotics Anonymous or [employer] or from treatment facilities.  If you relapse, get help as soon as you can. Some people make a plan with another person that outlines what they want that person to do for them if they relapse. The plan usually includes how to handle the relapse and who to notify in case of relapse.  Don't give up. Remember that a relapse doesn't mean that you have failed. Use the experience to learn the triggers that lead you to drink or use drugs. Then quit again. Recovery is a lifelong process. Many people have several relapses before they are able to quit for good.  Follow-up care is a key part of your treatment and safety. Be sure to make and go to all appointments, and call your doctor if you are having problems. It's also a good idea to know your test results and keep a list of the medicines you take.  When should you call for help?   Call 911  anytime you think you may need emergency care. For example, call if you or someone else:    Has overdosed or has withdrawal signs. Be sure to tell the emergency workers that you are or someone else is using or trying to quit using drugs. Overdose or withdrawal signs may include:  Losing consciousness.  Seizure.  Seeing or hearing things that aren't there (hallucinations).     Is thinking or talking about suicide or harming others.   Where to get help 24 hours a day, 7 days a week   If you or someone you know talks about suicide, self-harm, a mental health crisis, a substance use crisis, or any  "other kind of emotional distress, get help right away. You can:    Call the Suicide and Crisis Lifeline at 988.     Call 9-404-458-BIGZ (1-817.790.9233).     Text HOME to 818951 to access the Crisis Text Line.   Consider saving these numbers in your phone.  Go to [employer] for more information or to chat online.  Call your doctor now or seek immediate medical care if:    You are having withdrawal symptoms. These may include nausea or vomiting, sweating, shakiness, and anxiety.   Watch closely for changes in your health, and be sure to contact your doctor if:    You have a relapse.     You need more help or support to stop.   Where can you learn more?  Go to https://www.[employer].net/patiented  Enter H573 in the search box to learn more about \"Substance Use Disorder: Care Instructions.\"  Current as of: November 15, 2023               Content Version: 14.0    1502-1137 [employer].   Care instructions adapted under license by your healthcare professional. If you have questions about a medical condition or this instruction, always ask your healthcare professional. Healthwise, [employer] disclaims any warranty or liability for your use of this information.      "

## 2024-05-21 NOTE — PROGRESS NOTES
Preventive Care Visit  [facility]  CHANEL CORDOVA MD, Family Medicine  May 21, 2024      Assessment & Plan     Routine general medical examination at a health care facility  Mixed hyperlipidemia  - Lipid panel reflex to direct LDL Fasting; Future    Need for hepatitis C screening test  - Hepatitis C Screen Reflex to HCV RNA Quant and Genotype; Future    Missed menses  > Patient normally has regular periods, she is not on birth control but endorses consistent condom use   - HCG Qual, Urine ([id number]); Future    Patient has been advised of split billing requirements and indicates understanding: Yes    Follow-up plan:   - if the patient does have a positive pregnancy test she would like to pursue with the pregnancy, she doesn't have an established obgyn and would be interested in a referral    Subjective   Estela is a 28 year old, presenting for the following:  Physical (Fasting for labs today) and Pregnancy Test        5/21/2024     7:43 AM   Additional Questions   Roomed by Ivet FONTANA LPN   Accompanied by self         5/21/2024     7:43 AM   Patient Reported Additional Medications   Patient reports taking the following new medications no new meds        Health Care Directive  Patient does not have a Health Care Directive or Living Will: Discussed advance care planning with patient; however, patient declined at this time.    HPI    Concern - Late menses  Onset: about 2 weeks late   Description: some cramps and nausea   Therapies tried and outcome:  none  - hasn't taken an at home pregnancy test, would like to be tested today  Periods are usually regular     Menarche: 12 yo   LMP: 4/3/24 (estimated)   Duration: 5-7 days   Frequency: every 28-31 days   Flow: the first 2 days are her heaviest and she has to change her pads/tampons every 3-4 hours           5/21/2024   General Health   How would you rate your overall physical health? Good   Feel stress (tense, anxious, or unable to sleep) Only a  little   (!) STRESS CONCERN      5/21/2024   Nutrition   Three or more servings of calcium each day? (!) I DON'T KNOW   Diet: Regular (no restrictions)   How many servings of fruit and vegetables per day? (!) 2-3   How many sweetened beverages each day? 0-1         5/21/2024   Exercise   Days per week of moderate/strenous exercise 3 days         5/21/2024   Social Factors   Frequency of gathering with friends or relatives Twice a week   Worry food won't last until get money to buy more No   Food not last or not have enough money for food? No   Do you have housing?  Yes   Are you worried about losing your housing? No   Lack of transportation? No   Unable to get utilities (heat,electricity)? No         5/21/2024   Dental   Dentist two times every year? Yes         5/21/2024   TB Screening   Were you born outside of the [place]? Yes     Today's PHQ-2 Score:       5/21/2024     7:44 AM   PHQ-2 ( 1999 [employer])   Q1: Little interest or pleasure in doing things 0   Q2: Feeling down, depressed or hopeless 0   PHQ-2 Score 0         5/21/2024   Substance Use   Alcohol more than 3/day or more than 7/wk No   Do you use any other substances recreationally? (!) CANNABIS PRODUCTS     Social History     Tobacco Use    Smoking status: Never    Smokeless tobacco: Never   Vaping Use    Vaping status: Never Used   Substance Use Topics    Alcohol use: Yes    Drug use: Yes     Types: Marijuana          Mammogram Screening - Patient under 40 years of age: Routine Mammogram Screening not recommended.         5/21/2024   STI Screening   New sexual partner(s) since last STI/HIV test? No     History of abnormal Pap smear:         5/5/2023     7:16 AM   PAP / HPV   PAP Negative for Intraepithelial Lesion or Malignancy (NILM)            5/21/2024   Contraception/Family Planning   Questions about contraception or family planning No        Reviewed and updated as needed this visit by Provider                    Review of Systems   Constitutional:   "Negative for chills and fever.   HENT:  Negative for ear pain.    Eyes:  Negative for pain.   Respiratory:  Negative for cough.    Cardiovascular:  Negative for chest pain.   Gastrointestinal:  Negative for abdominal pain.   Genitourinary:  Negative for dysuria.   Musculoskeletal:  Negative for neck pain.   Skin:  Negative for rash.   Neurological:  Negative for headaches.        Objective    Exam  /76 (BP Location: Right arm, Patient Position: Sitting, Cuff Size: Adult Regular)   Pulse 103   Temp 98.2  F (36.8  C) (Tympanic)   Resp 18   Ht 1.617 m (5' 3.66\")   Wt 54.1 kg (119 lb 3.2 oz)   LMP 04/03/2024 (Approximate)   SpO2 100%   BMI 20.68 kg/m     Estimated body mass index is 20.68 kg/m  as calculated from the following:    Height as of this encounter: 1.617 m (5' 3.66\").    Weight as of this encounter: 54.1 kg (119 lb 3.2 oz).    Physical Exam  Constitutional:       General: She is not in acute distress.  HENT:      Head: Normocephalic and atraumatic.      Right Ear: External ear normal.      Left Ear: External ear normal.      Mouth/Throat:      Mouth: Mucous membranes are moist.      Pharynx: Oropharynx is clear. No oropharyngeal exudate or posterior oropharyngeal erythema.   Eyes:      Extraocular Movements: Extraocular movements intact.   Cardiovascular:      Rate and Rhythm: Normal rate and regular rhythm.      Heart sounds: Normal heart sounds.   Pulmonary:      Effort: Pulmonary effort is normal. No respiratory distress.      Breath sounds: Normal breath sounds. No wheezing or rhonchi.   Abdominal:      Palpations: Abdomen is soft. There is no mass.      Tenderness: There is no abdominal tenderness.   Musculoskeletal:         General: No deformity. Normal range of motion.      Cervical back: Normal range of motion and neck supple.   Skin:     General: Skin is warm.      Findings: No rash.   Neurological:      General: No focal deficit present.      Mental Status: She is alert and oriented to " person, place, and time.   Psychiatric:         Mood and Affect: Mood normal.                Signed Electronically by: CHANEL CORDOVA MD

## 2024-05-22 NOTE — RESULT ENCOUNTER NOTE
Bassam Alvarez,     It is a pleasure providing you with medical care. I have received and reviewed your results, and have the following recommendations:     Hep C negative.        Sincerely,     Marielos Kaiser MD

## 2024-05-24 NOTE — RESULT ENCOUNTER NOTE
Looks like the patient has not seen their results. Could someone please notify the patient of their results and recommendations?     Thank you so much!     Sincerely,     Marielos Kaiser MD

## 2025-04-21 ENCOUNTER — PATIENT OUTREACH (OUTPATIENT)
Dept: CARE COORDINATION | Facility: CLINIC | Age: 30
End: 2025-04-21
Payer: COMMERCIAL

## 2025-05-05 ENCOUNTER — PATIENT OUTREACH (OUTPATIENT)
Dept: CARE COORDINATION | Facility: CLINIC | Age: 30
End: 2025-05-05
Payer: COMMERCIAL

## 2025-06-21 ENCOUNTER — HEALTH MAINTENANCE LETTER (OUTPATIENT)
Age: 30
End: 2025-06-21